# Patient Record
Sex: MALE | Race: ASIAN | NOT HISPANIC OR LATINO | URBAN - METROPOLITAN AREA
[De-identification: names, ages, dates, MRNs, and addresses within clinical notes are randomized per-mention and may not be internally consistent; named-entity substitution may affect disease eponyms.]

---

## 2020-12-04 ENCOUNTER — EMERGENCY (EMERGENCY)
Facility: HOSPITAL | Age: 70
LOS: 1 days | Discharge: HOME | End: 2020-12-04
Attending: EMERGENCY MEDICINE | Admitting: EMERGENCY MEDICINE
Payer: MEDICAID

## 2020-12-04 VITALS
RESPIRATION RATE: 17 BRPM | OXYGEN SATURATION: 98 % | DIASTOLIC BLOOD PRESSURE: 90 MMHG | TEMPERATURE: 98 F | HEART RATE: 60 BPM | SYSTOLIC BLOOD PRESSURE: 181 MMHG

## 2020-12-04 PROCEDURE — 99283 EMERGENCY DEPT VISIT LOW MDM: CPT

## 2020-12-04 PROCEDURE — 73562 X-RAY EXAM OF KNEE 3: CPT | Mod: 26,LT,RT

## 2020-12-04 RX ORDER — ACETAMINOPHEN 500 MG
650 TABLET ORAL ONCE
Refills: 0 | Status: COMPLETED | OUTPATIENT
Start: 2020-12-04 | End: 2020-12-04

## 2020-12-04 RX ORDER — KETOROLAC TROMETHAMINE 30 MG/ML
30 SYRINGE (ML) INJECTION ONCE
Refills: 0 | Status: DISCONTINUED | OUTPATIENT
Start: 2020-12-04 | End: 2020-12-04

## 2020-12-04 RX ADMIN — Medication 30 MILLIGRAM(S): at 13:39

## 2020-12-04 RX ADMIN — Medication 650 MILLIGRAM(S): at 13:39

## 2020-12-04 NOTE — ED PROVIDER NOTE - CLINICAL SUMMARY MEDICAL DECISION MAKING FREE TEXT BOX
71 yo male with pmh of htn and OA presents for bilateral knee pain x 3 years requesting b/l knee replacement. Ambulatory, no acute swelling. Neurovascularlly intact. Will get b/l XR and get in touch with ortho. Anticipate dc home.

## 2020-12-04 NOTE — ED PROVIDER NOTE - OBJECTIVE STATEMENT
71 yo male with pmh of htn and OA presents for bilateral knee pain x 3 years. States he was diagnosed with OA and was set to have b/l knee replacement in [place] but has not been able to because of COVID. States the pain is increasingly worse. Takes glucosamine at home for OA. Reports that he cannot sleep or walk. Deneis back pain, swelling, hot joint, and other associated sx. Requesting that we do the knee replacement today here.

## 2020-12-04 NOTE — ED PROVIDER NOTE - PHYSICAL EXAMINATION
PE:   GEN: Awake, alert, interactive, NAD, non-toxic appearing.   HEAD AND NECK: NC/AT. Airway patent. Neck supple.   EYES: Clear b/l. PERRL  CARDIAC: RRR. S1, S2. No evident pedal edema.    RESP: Normal respiratory effort with no use of accessory muscles or retractions. Clear throughout on auscultation.  ABD: soft, non-distended, non-tender. No rebound, no guarding.   NEURO: AOx3, CN II-XII grossly intact, no focal deficits.   MSK: Knees symmetric, FROM with pain. Ambulatory. Sensation intact. Moving all extremities with no apparent deformities.   SKIN: Warm, dry, intact normal color

## 2020-12-04 NOTE — ED ADULT NURSE NOTE - NSIMPLEMENTINTERV_GEN_ALL_ED
Implemented All Universal Safety Interventions:  [place] to call system. Call bell, personal items and telephone within reach. Instruct patient to call for assistance. Room bathroom lighting operational. Non-slip footwear when patient is off stretcher. Physically safe environment: no spills, clutter or unnecessary equipment. Stretcher in lowest position, wheels locked, appropriate side rails in place.

## 2020-12-04 NOTE — ED ADULT NURSE NOTE - OBJECTIVE STATEMENT
patient alert times four chief complaint bilateral knee pain 3xyears progressively getting worse. patient PMH osteoarthritis, scheduled for bilateral knee replacement in [place] but unable to travel due to covid 19. patient ambulates with cane at home. 1 person assist with wheelchair assistance in ED. skin warm dry appropriate color. + pedal pulses. Denies nausea headache denies paresthesias. patient medicated for pain sitting comfortably in stretcher fall precautions in place. updated on plan of care pending radiology result. Safety education reinforced with call bell within reach. Verbalizes understanding of use. Will continue to monitor.

## 2020-12-04 NOTE — ED ADULT TRIAGE NOTE - CHIEF COMPLAINT QUOTE
pt c/o b/l knee pain x 3 years. went to PMD and had x-ray, diagnosed with osteoarthritis, told to follow up with ortho. had a scheduled b/l knee replacement in [facility], but was unable to travel 2/2 covid.

## 2020-12-04 NOTE — ED ADULT NURSE NOTE - CHIEF COMPLAINT QUOTE
pt c/o b/l knee pain x 3 years. went to PMD and had x-ray, diagnosed with osteoarthritis, told to follow up with ortho. had a scheduled b/l knee replacement in [facility], but was unable to travel 2/2 covid.

## 2020-12-04 NOTE — ED PROVIDER NOTE - NS ED ROS FT
Constitutional: (-) Fever, (-) Anorexia, (-) Generalized Malaise  Eyes: (-)Discharge, (-) Irritation,  (-) Visual changes  EARS: (-) Ear Pain, (-) Apparent hearing changes  NOSE: (-) Congestion, (-) Bloody nose  MOUTH/THROAT: (-) Vocal Changes, (-) Drooling, (-) Sore throat  NECK: (-) Lumps, (-) Stiffness, (-) Pain  CV: (-) Chest Pain, (-) Palpitations, (-) Edema   RESP:  (-) Cough, (-) SOB, (-) REAGAN,  (-) Wheezing  GI: (-) Nausea, (-) Vomiting, (-) Abdominal Pain, (-) Diarrhea, (-) Constipation, (-) Bloody stools  : (-) Dysuria, (-) Frequency, (-) Hematuria, (-) Incontinence  MSK: (+) Joint Pain, (-) Back Pain, (-) Deformities  SKIN: (-) Wounds, (-) Color change, (-)Rash, (-) Swelling  NEURO:(-) Headache, (-) Dizziness, (-) Numbness/Tingling,  (-)LOC

## 2020-12-04 NOTE — ED PROVIDER NOTE - NSFOLLOWUPINSTRUCTIONS_ED_ALL_ED_FT
Take Naproxen 500mg 1 tab every 12 hours for pain.   Avoid strenuous activity.   Follow up with orthopedics as soon as possible for further evaluation.

## 2020-12-04 NOTE — ED PROVIDER NOTE - PATIENT PORTAL LINK FT
You can access the FollowMyHealth Patient Portal offered by [facility] by registering at the following website: [email or web address]. By joining [facility]’s FollowMyHealth portal, you will also be able to view your health information using other applications (apps) compatible with our system.

## 2020-12-04 NOTE — ED PROVIDER NOTE - CONDITION AT DISCHARGE:
Satisfactory

## 2020-12-09 ENCOUNTER — APPOINTMENT (OUTPATIENT)
Dept: ORTHOPEDIC SURGERY | Facility: CLINIC | Age: 70
End: 2020-12-09
Payer: MEDICAID

## 2020-12-09 VITALS
BODY MASS INDEX: 23.63 KG/M2 | OXYGEN SATURATION: 97 % | SYSTOLIC BLOOD PRESSURE: 132 MMHG | HEIGHT: 66 IN | WEIGHT: 147 LBS | HEART RATE: 93 BPM | DIASTOLIC BLOOD PRESSURE: 73 MMHG

## 2020-12-09 DIAGNOSIS — M25.562 PAIN IN RIGHT KNEE: ICD-10-CM

## 2020-12-09 DIAGNOSIS — Z72.3 LACK OF PHYSICAL EXERCISE: ICD-10-CM

## 2020-12-09 DIAGNOSIS — Z78.9 OTHER SPECIFIED HEALTH STATUS: ICD-10-CM

## 2020-12-09 DIAGNOSIS — M25.561 PAIN IN RIGHT KNEE: ICD-10-CM

## 2020-12-09 DIAGNOSIS — M17.12 UNILATERAL PRIMARY OSTEOARTHRITIS, LEFT KNEE: ICD-10-CM

## 2020-12-09 DIAGNOSIS — M17.11 UNILATERAL PRIMARY OSTEOARTHRITIS, RIGHT KNEE: ICD-10-CM

## 2020-12-09 DIAGNOSIS — Z86.79 PERSONAL HISTORY OF OTHER DISEASES OF THE CIRCULATORY SYSTEM: ICD-10-CM

## 2020-12-09 PROBLEM — Z00.00 ENCOUNTER FOR PREVENTIVE HEALTH EXAMINATION: Status: ACTIVE | Noted: 2020-12-09

## 2020-12-09 PROCEDURE — 99072 ADDL SUPL MATRL&STAF TM PHE: CPT

## 2020-12-09 PROCEDURE — 73564 X-RAY EXAM KNEE 4 OR MORE: CPT | Mod: RT

## 2020-12-09 PROCEDURE — 99205 OFFICE O/P NEW HI 60 MIN: CPT

## 2020-12-09 RX ORDER — NAPROXEN 500 MG/1
500 TABLET ORAL
Refills: 0 | Status: ACTIVE | COMMUNITY

## 2020-12-09 NOTE — PHYSICAL EXAM
[de-identified] : Physical examination of both knees discloses significant varus deformities bilaterally.  The patient is experiencing severe pain to the medial compartments of both knees with attempted passive motion which is highly limited. [de-identified] : X-rays taken of both knees in AP lateral skyline and open notch views disclose severe end-stage bone-on-bone degenerative arthritis with complete loss of the medial joint space, varus deformities

## 2020-12-09 NOTE — DISCUSSION/SUMMARY
[de-identified] : The patient and his son were informed of the findings.  They understand that total knee replacement is the only realistic option at this point.  The patient has previously undergone prior treatments including NSAIDs physical therapy and injections in [place].  In fact he was scheduled for surgery this past September until [facility] restricted his travel.  Arrangements will be made to proceed with right-sided total knee arthroplasty first followed by the left side several weeks later.  Medical clearance pending\par \par \par Aside from being seen and evaluated for his knee, the patient underwent a lengthy face to face discussion pertaining to total knee arthroplasty. This included instructions and information about the pre-operative preparation as well as information pertaining to the hospitalization and post-operative care and rehabilitation.\par \par The patient was also made aware of the potential risks and possible complications as it pertains to total knee arthroplasty as well as the general surgical and anesthesia risks and complications. This includes, but is not limited to, possible wound infection, cardio-pulmonary problems such as DVT, fat embolism and PE (potentially fatal), soft tissue swelling, stiffness and fibrosis, skin slough, muscle or nerve injury, compartment syndrome, blood transfusion reaction/infection. Problems with the knee components include possible loosening or wearing-out which would require revision surgery.\par \par Patient specific instruments may also be utilized, if applicable, to help achieve more optimal implant alignment, customized to your unique knee anatomy. MRI (Magnetic Resonance Images) and X-Ray images of your affected leg are scanned into an advanced web-based software program, which will generate virtual images of your knee which are read and reviewed and ultimately approved by me. Surgical instruments and guides are then designed and built, mapping out specific bone cuts to accurately align the implants to your knee while performing the surgery. Time under anesthesia may be reduced, which may also lead to less blood loss and a lower risk of infection.\par \par Revision surgery or repeat arthrotomy may also be required for deep infections, and in extreme cases, the implants may be removed either temporarily (staged procedures) or permanently (resulting in knee fusion or even amputation in extreme cases). \par \par The patient understands the above discussion and has been given ample time to ask any other questions or address any concerns pertaining to the purposed surgery. They are also aware that our office staff, specifically our surgical coordinator will continue to be available to guide and instruct the patient during the perioperative phase, as well as answer or relay any other questions that may arise.\par \par Total patient encounter time > 5 5 minutes\par \par

## 2020-12-09 NOTE — HISTORY OF PRESENT ILLNESS
[Worsening] : worsening [___ yrs] : [unfilled] year(s) ago [10] : a maximum pain level of 10/10 [Constant] : ~He/She~ states the symptoms seem to be constant [Walking] : worsened by walking [Knee Flexion] : worsened with knee flexion [None] : No relieving factors are noted [de-identified] : Pt presents for initial evaluation with pain in his bilateral knees worse to the right knee. Pt was seen at [facility] 12/4/2020 xray taken while lying down. Pt was given Naproxen for pain with no real relief, pt told to follow up with an orthopedist. Pt has crunching and buckling to the knees, pt had injections in [place] approx 6 mos ago  apparently, cortisone and gel injections the son states who is acting as . [de-identified] : certain movements, stairs

## 2020-12-21 ENCOUNTER — OUTPATIENT (OUTPATIENT)
Dept: OUTPATIENT SERVICES | Facility: HOSPITAL | Age: 70
LOS: 1 days | End: 2020-12-21
Payer: MEDICAID

## 2020-12-21 VITALS
WEIGHT: 139.99 LBS | OXYGEN SATURATION: 98 % | DIASTOLIC BLOOD PRESSURE: 80 MMHG | TEMPERATURE: 97 F | SYSTOLIC BLOOD PRESSURE: 138 MMHG | RESPIRATION RATE: 16 BRPM | HEART RATE: 74 BPM | HEIGHT: 65 IN

## 2020-12-21 DIAGNOSIS — G47.33 OBSTRUCTIVE SLEEP APNEA (ADULT) (PEDIATRIC): ICD-10-CM

## 2020-12-21 DIAGNOSIS — K21.9 GASTRO-ESOPHAGEAL REFLUX DISEASE WITHOUT ESOPHAGITIS: ICD-10-CM

## 2020-12-21 DIAGNOSIS — I10 ESSENTIAL (PRIMARY) HYPERTENSION: ICD-10-CM

## 2020-12-21 DIAGNOSIS — M17.11 UNILATERAL PRIMARY OSTEOARTHRITIS, RIGHT KNEE: ICD-10-CM

## 2020-12-21 DIAGNOSIS — Z98.890 OTHER SPECIFIED POSTPROCEDURAL STATES: Chronic | ICD-10-CM

## 2020-12-21 LAB
ANION GAP SERPL CALC-SCNC: 8 MMOL/L — SIGNIFICANT CHANGE UP (ref 7–14)
APPEARANCE UR: CLEAR — SIGNIFICANT CHANGE UP
BILIRUB UR-MCNC: NEGATIVE — SIGNIFICANT CHANGE UP
BLD GP AB SCN SERPL QL: NEGATIVE — SIGNIFICANT CHANGE UP
BUN SERPL-MCNC: 14 MG/DL — SIGNIFICANT CHANGE UP (ref 7–23)
CALCIUM SERPL-MCNC: 9.5 MG/DL — SIGNIFICANT CHANGE UP (ref 8.4–10.5)
CHLORIDE SERPL-SCNC: 102 MMOL/L — SIGNIFICANT CHANGE UP (ref 98–107)
CO2 SERPL-SCNC: 27 MMOL/L — SIGNIFICANT CHANGE UP (ref 22–31)
COLOR SPEC: SIGNIFICANT CHANGE UP
CREAT SERPL-MCNC: 1 MG/DL — SIGNIFICANT CHANGE UP (ref 0.5–1.3)
DIFF PNL FLD: NEGATIVE — SIGNIFICANT CHANGE UP
GLUCOSE SERPL-MCNC: 88 MG/DL — SIGNIFICANT CHANGE UP (ref 70–99)
GLUCOSE UR QL: NEGATIVE — SIGNIFICANT CHANGE UP
HCT VFR BLD CALC: 41 % — SIGNIFICANT CHANGE UP (ref 39–50)
HGB BLD-MCNC: 13.4 G/DL — SIGNIFICANT CHANGE UP (ref 13–17)
KETONES UR-MCNC: NEGATIVE — SIGNIFICANT CHANGE UP
LEUKOCYTE ESTERASE UR-ACNC: NEGATIVE — SIGNIFICANT CHANGE UP
MCHC RBC-ENTMCNC: 29.6 PG — SIGNIFICANT CHANGE UP (ref 27–34)
MCHC RBC-ENTMCNC: 32.7 GM/DL — SIGNIFICANT CHANGE UP (ref 32–36)
MCV RBC AUTO: 90.7 FL — SIGNIFICANT CHANGE UP (ref 80–100)
NITRITE UR-MCNC: NEGATIVE — SIGNIFICANT CHANGE UP
NRBC # BLD: 0 /100 WBCS — SIGNIFICANT CHANGE UP
NRBC # FLD: 0 K/UL — SIGNIFICANT CHANGE UP
PH UR: 6 — SIGNIFICANT CHANGE UP (ref 5–8)
PLATELET # BLD AUTO: 156 K/UL — SIGNIFICANT CHANGE UP (ref 150–400)
POTASSIUM SERPL-MCNC: 4.4 MMOL/L — SIGNIFICANT CHANGE UP (ref 3.5–5.3)
POTASSIUM SERPL-SCNC: 4.4 MMOL/L — SIGNIFICANT CHANGE UP (ref 3.5–5.3)
PROT UR-MCNC: NEGATIVE — SIGNIFICANT CHANGE UP
RBC # BLD: 4.52 M/UL — SIGNIFICANT CHANGE UP (ref 4.2–5.8)
RBC # FLD: 13 % — SIGNIFICANT CHANGE UP (ref 10.3–14.5)
RH IG SCN BLD-IMP: POSITIVE — SIGNIFICANT CHANGE UP
SODIUM SERPL-SCNC: 137 MMOL/L — SIGNIFICANT CHANGE UP (ref 135–145)
SP GR SPEC: 1.01 — LOW (ref 1.01–1.02)
UROBILINOGEN FLD QL: SIGNIFICANT CHANGE UP
WBC # BLD: 6.35 K/UL — SIGNIFICANT CHANGE UP (ref 3.8–10.5)
WBC # FLD AUTO: 6.35 K/UL — SIGNIFICANT CHANGE UP (ref 3.8–10.5)

## 2020-12-21 PROCEDURE — 93010 ELECTROCARDIOGRAM REPORT: CPT

## 2020-12-21 RX ORDER — SODIUM CHLORIDE 9 MG/ML
3 INJECTION INTRAMUSCULAR; INTRAVENOUS; SUBCUTANEOUS EVERY 8 HOURS
Refills: 0 | Status: DISCONTINUED | OUTPATIENT
Start: 2020-12-28 | End: 2021-01-01

## 2020-12-21 RX ORDER — ASPIRIN/CALCIUM CARB/MAGNESIUM 324 MG
1 TABLET ORAL
Qty: 0 | Refills: 0 | DISCHARGE

## 2020-12-21 NOTE — H&P PST ADULT - NSICDXPROBLEM_GEN_ALL_CORE_FT
PROBLEM DIAGNOSES  Problem: Unilateral primary osteoarthritis, right knee  Assessment and Plan:     Problem: Hypertension  Assessment and Plan:     Problem: GERD (gastroesophageal reflux disease)  Assessment and Plan:        PROBLEM DIAGNOSES  Problem: Unilateral primary osteoarthritis, right knee  Assessment and Plan: Right Total Knee Replacement  Pre op instructions including Hibiclens with teach back reviewed with pt via [place]  Grecia   # [id number]. Pt reports understanding of pre op instructions  Pt to Dr Jenkins for pre op medical evaluation  Per pt Covid test scheduled pre op     Problem: GERD (gastroesophageal reflux disease)  Assessment and Plan:     Problem: Hypertension  Assessment and Plan:        PROBLEM DIAGNOSES  Problem: Unilateral primary osteoarthritis, right knee  Assessment and Plan: Right Total Knee Replacement  Pre op instructions including Hibiclens with teach back reviewed with pt via [place]  Grecia   # [id number]. Pt reports understanding of pre op instructions  Pt to Dr Jenkins for pre op medical evaluation  Per pt Covid test scheduled pre op     Problem: GERD (gastroesophageal reflux disease)  Assessment and Plan: Pt to take famotidine dos     Problem: Hypertension  Assessment and Plan: Pt to take Losartan dos     Problem: WAQAR (obstructive sleep apnea)  Assessment and Plan: WAQAR Precautions  OR booking notified via fax        PROBLEM DIAGNOSES  Problem: Unilateral primary osteoarthritis, right knee  Assessment and Plan: Right Total Knee Replacement  Pre op instructions including Hibiclens with teach back reviewed with pt via [place]  Grecia   # [id number]. Pt reports understanding of pre op instructions  Pt to Dr Jenkins for pre op medical evaluation  Per pt Covid test scheduled pre op   During instructions pt reported pain medicine from [place]; pt does not recall name ; after extendive conversation pt agreed to have son call after arrival home with name of rx    Problem: GERD (gastroesophageal reflux disease)  Assessment and Plan: Pt to take famotidine evening prior to surgery as per usual    Problem: Hypertension  Assessment and Plan: Pt to take Losartan dos     Problem: WAQAR (obstructive sleep apnea)  Assessment and Plan: WAQAR Precautions  OR booking notified via fax        PROBLEM DIAGNOSES  Problem: Unilateral primary osteoarthritis, right knee  Assessment and Plan: Right Total Knee Replacement  Pre op instructions including Hibiclens with teach back reviewed with pt via [email or web address]  Grecia   # [id number]. Pt reports understanding of pre op instructions. Pre op shower instructions also reviewed with son Alverto  Pt to Dr Jenkins for pre op medical evaluation; letter provided requested pre op instructions related to aspirin  Per pt Covid test scheduled pre op   During instructions pt reported pain medicine from [place]; pt does not recall name ; after extendive conversation pt agreed to have son call after arrival home with name of rx    Problem: GERD (gastroesophageal reflux disease)  Assessment and Plan: Pt to take famotidine evening prior to surgery as per usual    Problem: Hypertension  Assessment and Plan: Pt to take Losartan dos     Problem: WAQAR (obstructive sleep apnea)  Assessment and Plan: WAQAR Precautions  OR booking notified via fax        PROBLEM DIAGNOSES  Problem: Unilateral primary osteoarthritis, right knee  Assessment and Plan: Right Total Knee Replacement  Pre op instructions including Hibiclens with teach back reviewed with pt via [place]  Grecia   # [id number]. Pt reports understanding of pre op instructions. Pre op shower instructions also reviewed with maritza Del Toro  Pt to Dr Jenkins for pre op medical evaluation; letter provided requested pre op instructions related to aspirin  Add ; 12/21/2020  3:40 pm ; s/w maritza Del Toro ; last ASA 2 weeks ago . Per Alverto as per pcp asa to be held pre op  Per pt Covid test scheduled pre op       Problem: GERD (gastroesophageal reflux disease)  Assessment and Plan: Pt to take famotidine evening prior to surgery as per usual    Problem: Hypertension  Assessment and Plan: Pt to take Losartan dos     Problem: WAQAR (obstructive sleep apnea)  Assessment and Plan: WAQAR Precautions  OR booking notified via fax

## 2020-12-21 NOTE — H&P PST ADULT - MENTAL STATUS
Pt is a fair historian ; information obtained with assistance of pacific  Pt is a fair historian ; information obtained with assistance of pacific ; site and surgeon reviewed with maritza Del Toro

## 2020-12-21 NOTE — H&P PST ADULT - NSICDXPASTMEDICALHX_GEN_ALL_CORE_FT
PAST MEDICAL HISTORY:  Arthritis     GERD (gastroesophageal reflux disease)     Hypercholesterolemia     Hypertension

## 2020-12-21 NOTE — H&P PST ADULT - MUSCULOSKELETAL
decreased ROM due to pain detailed exam details… pt in w/c , ambulated with assistance ; pt reports able to shower without assistance/decreased ROM due to pain

## 2020-12-21 NOTE — H&P PST ADULT - NS PRO FEM  PAP SMEARS 3YRS
Plan: -Avoid high potassium foods \\n-Drink plenty of water
Continue Regimen: benzoyl peroxide 10 % topical cleanser \\nCleanse affected area on face twice a day\\n\\nclindamycin 1 % topical gel \\nApply to face twice a day\\n\\ntretinoin 0.025 % topical gel \\nApply to face once a day at bedtime\\n\\nspironolactone 50 mg tablet - Take one tablet daily
Detail Level: Zone
not applicable (Male)

## 2020-12-21 NOTE — H&P PST ADULT - GENERAL COMMENTS
pt reports decrease in appetite with gerd pt reports decrease in appetite with gerd; rx famotidine ; metoclopramide with meals pt reports decrease in appetite with gerd; rx famotidine ; metoclopramide ? with meals pt reports decrease in appetite with gerd; rx famotidine

## 2020-12-21 NOTE — H&P PST ADULT - CONSTITUTIONAL DETAILS
Past Medical History:   Diagnosis Date   • Actinic keratosis 02/26/2008    right cheek   • Cough 02/26/2008    chronic-due to sinus and reflux   • Esophageal reflux 02/26/2008   • h/o diverticulitis    • Hypertrophy (benign) of prostate 02/26/2008    mild to moderate   • Other and unspecified disc disorder of thoracic region 02/27/2008    mild anterior osteophytes and mild anterior disc space narrowing at  few levels   • PAIN BACK  (See also SPONDYLOSIS) 02/26/2008       
well-developed/well-groomed/well-nourished/no distress

## 2020-12-21 NOTE — H&P PST ADULT - HISTORY OF PRESENT ILLNESS
Pt is a 70 y.o. male ; pt speaks [place] ; information obtained from patient with assistance of [employer]  Grecia Mckenzie . Pt Pt is a 70 y.o. male ; pt speaks [place] ; information obtained from patient with assistance of [employer]  Grecia [id number] . Pt reports bilateral knee pain; pt states right > left . Pt to surgeon ; pt now resents for Right Total Knee Replacement  Pt is a 70 y.o. male ; pt speaks [place] ; information obtained from patient with assistance of [employer]  Grecia [id number] . Pt reports bilateral knee pain; pt states right > left . Pt to surgeon ; pt now presents for Right Total Knee Replacement    Pt is a poor historian         Pt is a 70 y.o. male ; pt speaks [place] ; information obtained from patient with assistance of [employer]  Grecia [id number] . Pt reports bilateral knee pain; pt states right > left . Pt to surgeon ; pt now presents for Right Total Knee Replacement    Pt is a poor historian  ; site and surgeon verified with son

## 2020-12-21 NOTE — H&P PST ADULT - NSANTHOSAYNRD_GEN_A_CORE
Cathy Toledo is a 80 y.o. male who was seen by synchronous (real-time) audio-video technology on 6/11/2020. Consent: Cathy Toledo, who was seen by synchronous (real-time) audio-video technology, and/or his healthcare decision maker, is aware that this patient-initiated, Telehealth encounter on 6/11/2020 is a billable service, with coverage as determined by his insurance carrier. He is aware that he may receive a bill and has provided verbal consent to proceed: Yes. Assessment & Plan:  
 
Diagnoses and all orders for this visit: 
 
1. Gastroenteritis 
-     ciprofloxacin HCl (CIPRO) 500 mg tablet; Take 1 Tab by mouth two (2) times a day for 5 days. -     ondansetron (ZOFRAN ODT) 8 mg disintegrating tablet; Take 1 Tab by mouth every eight (8) hours as needed for Nausea or Vomiting. Follow-up and Dispositions · Return if symptoms worsen or fail to improve. [address] Subjective: Cathy Toledo is a 80 y.o. male who was seen for Diarrhea 
c/o started 3 days ago with N/V and diarrhea, loose BM's, 2-3 daily, not any better No fever or chills No abdominal pain Prior to Admission medications Medication Sig Start Date End Date Taking? Authorizing Provider  
ciprofloxacin HCl (CIPRO) 500 mg tablet Take 1 Tab by mouth two (2) times a day for 5 days. 6/11/20 6/16/20 Yes Darron MCCORMICK MD  
ondansetron (ZOFRAN ODT) 8 mg disintegrating tablet Take 1 Tab by mouth every eight (8) hours as needed for Nausea or Vomiting. 6/11/20  Yes Jordi Ibarra MD  
valsartan-hydroCHLOROthiazide (DIOVAN-HCT) 160-12.5 mg per tablet Take 1 Tab by mouth daily. 10/8/19  Yes Darron MCCORMICK MD  
amLODIPine (NORVASC) 5 mg tablet Take 1 Tab by mouth daily.  10/8/19  Yes Jordi Ibarra MD  
varicella-zoster recombinant, PF, (SHINGRIX, PF,) 50 mcg/0.5 mL susr injection 0.5mL by IntraMUSCular route once now and then repeat in 2-6 months 10/8/19   Jordi Ibarra MD  
 albuterol (PROVENTIL HFA, VENTOLIN HFA, PROAIR HFA) 90 mcg/actuation inhaler Take 2 Puffs by inhalation every six (6) hours as needed for Wheezing or Shortness of Breath. 10/8/19   Gurinder MCCORMICK MD  
acetaminophen (TYLENOL) 325 mg tablet Take  by mouth every four (4) hours as needed for Pain. Provider, Historical  
 
No Known Allergies Patient Active Problem List  
 Diagnosis Date Noted  Essential hypertension 06/05/2019  Pulmonary emphysema ([address] 06/05/2019  Mixed hyperlipidemia 06/05/2019 Past Surgical History:  
Procedure Laterality Date  HX ACL RECONSTRUCTION Bilateral 1980  HX CATARACT REMOVAL Left 07/18/2019  HX CHOLECYSTECTOMY Social History Tobacco Use  Smoking status: Former Smoker  Smokeless tobacco: Never Used Substance Use Topics  Alcohol use: Yes Alcohol/week: 10.0 standard drinks Types: 10 Glasses of wine per week Comment: two glasses of wine a day Review of Systems Constitutional: Negative for chills and fever. Gastrointestinal: Negative for abdominal pain and melena. Objective: There were no vitals taken for this visit. General: alert, cooperative, no distress Mental  status: normal mood, behavior, speech, dress, motor activity, and thought processes, able to follow commands HENT: NCAT Neck: no visualized mass Resp: no respiratory distress Neuro: no gross deficits Skin: no discoloration or lesions of concern on visible areas Psychiatric: normal affect, consistent with stated mood, no evidence of hallucinations Additional exam findings: We discussed the expected course, resolution and complications of the diagnosis(es) in detail. Medication risks, benefits, costs, interactions, and alternatives were discussed as indicated. I advised him to contact the office if his condition worsens, changes or fails to improve as anticipated. He expressed understanding with the diagnosis(es) and plan. Rosalba Handler is a 80 y.o. male who was evaluated by a video visit encounter for concerns as above. Patient identification was verified prior to start of the visit. A caregiver was present when appropriate. Due to this being a TeleHealth encounter (During [facility]- public health emergency), evaluation of the following organ systems was limited: Vitals/Constitutional/EENT/Resp/CV/GI//MS/Neuro/Skin/Heme-Lymph-Imm. Pursuant to the emergency declaration under the [address] [place] [address], [facility] waiver authority and the [employer] Act, this Virtual  Visit was conducted, with patient's (and/or legal guardian's) consent, to reduce the patient's risk of exposure to COVID-19 and provide necessary medical care. Services were provided through a video synchronous discussion virtually to substitute for in-person clinic visit. Patient and provider were located at their individual homes. This service was provided through Telehealth, both the (pt location) pt home and [employer] Jessica Levin MD 
 
 No. WAQAR screening performed.  STOP BANG Legend: 0-2 = LOW Risk; 3-4 = INTERMEDIATE Risk; 5-8 = HIGH Risk

## 2020-12-22 LAB
CULTURE RESULTS: SIGNIFICANT CHANGE UP
SPECIMEN SOURCE: SIGNIFICANT CHANGE UP

## 2020-12-23 LAB
CULTURE RESULTS: SIGNIFICANT CHANGE UP
SPECIMEN SOURCE: SIGNIFICANT CHANGE UP

## 2020-12-24 DIAGNOSIS — Z01.818 ENCOUNTER FOR OTHER PREPROCEDURAL EXAMINATION: ICD-10-CM

## 2020-12-25 ENCOUNTER — APPOINTMENT (OUTPATIENT)
Dept: DISASTER EMERGENCY | Facility: CLINIC | Age: 70
End: 2020-12-25

## 2020-12-26 LAB — SARS-COV-2 N GENE NPH QL NAA+PROBE: NOT DETECTED

## 2020-12-27 ENCOUNTER — TRANSCRIPTION ENCOUNTER (OUTPATIENT)
Age: 70
End: 2020-12-27

## 2020-12-28 ENCOUNTER — TRANSCRIPTION ENCOUNTER (OUTPATIENT)
Age: 70
End: 2020-12-28

## 2020-12-28 ENCOUNTER — INPATIENT (INPATIENT)
Facility: HOSPITAL | Age: 70
LOS: 3 days | Discharge: NURSING, REHAB OR LONG-TERM CARE | End: 2021-01-01
Attending: ORTHOPAEDIC SURGERY | Admitting: ORTHOPAEDIC SURGERY
Payer: MEDICAID

## 2020-12-28 ENCOUNTER — RESULT REVIEW (OUTPATIENT)
Age: 70
End: 2020-12-28

## 2020-12-28 ENCOUNTER — APPOINTMENT (OUTPATIENT)
Dept: ORTHOPEDIC SURGERY | Facility: HOSPITAL | Age: 70
End: 2020-12-28

## 2020-12-28 VITALS
RESPIRATION RATE: 16 BRPM | TEMPERATURE: 98 F | DIASTOLIC BLOOD PRESSURE: 86 MMHG | HEIGHT: 65 IN | OXYGEN SATURATION: 97 % | SYSTOLIC BLOOD PRESSURE: 157 MMHG | HEART RATE: 73 BPM | WEIGHT: 139.99 LBS

## 2020-12-28 DIAGNOSIS — M17.11 UNILATERAL PRIMARY OSTEOARTHRITIS, RIGHT KNEE: ICD-10-CM

## 2020-12-28 DIAGNOSIS — Z98.890 OTHER SPECIFIED POSTPROCEDURAL STATES: Chronic | ICD-10-CM

## 2020-12-28 LAB
ANION GAP SERPL CALC-SCNC: 7 MMOL/L — SIGNIFICANT CHANGE UP (ref 7–14)
BUN SERPL-MCNC: 15 MG/DL — SIGNIFICANT CHANGE UP (ref 7–23)
CALCIUM SERPL-MCNC: 9.3 MG/DL — SIGNIFICANT CHANGE UP (ref 8.4–10.5)
CHLORIDE SERPL-SCNC: 103 MMOL/L — SIGNIFICANT CHANGE UP (ref 98–107)
CO2 SERPL-SCNC: 26 MMOL/L — SIGNIFICANT CHANGE UP (ref 22–31)
CREAT SERPL-MCNC: 0.94 MG/DL — SIGNIFICANT CHANGE UP (ref 0.5–1.3)
GLUCOSE SERPL-MCNC: 103 MG/DL — HIGH (ref 70–99)
HCT VFR BLD CALC: 39.7 % — SIGNIFICANT CHANGE UP (ref 39–50)
HGB BLD-MCNC: 13.1 G/DL — SIGNIFICANT CHANGE UP (ref 13–17)
MCHC RBC-ENTMCNC: 29.6 PG — SIGNIFICANT CHANGE UP (ref 27–34)
MCHC RBC-ENTMCNC: 33 GM/DL — SIGNIFICANT CHANGE UP (ref 32–36)
MCV RBC AUTO: 89.6 FL — SIGNIFICANT CHANGE UP (ref 80–100)
NRBC # BLD: 0 /100 WBCS — SIGNIFICANT CHANGE UP
NRBC # FLD: 0 K/UL — SIGNIFICANT CHANGE UP
PLATELET # BLD AUTO: 126 K/UL — LOW (ref 150–400)
POTASSIUM SERPL-MCNC: 4.9 MMOL/L — SIGNIFICANT CHANGE UP (ref 3.5–5.3)
POTASSIUM SERPL-SCNC: 4.9 MMOL/L — SIGNIFICANT CHANGE UP (ref 3.5–5.3)
RBC # BLD: 4.43 M/UL — SIGNIFICANT CHANGE UP (ref 4.2–5.8)
RBC # FLD: 13.2 % — SIGNIFICANT CHANGE UP (ref 10.3–14.5)
RH IG SCN BLD-IMP: POSITIVE — SIGNIFICANT CHANGE UP
SODIUM SERPL-SCNC: 136 MMOL/L — SIGNIFICANT CHANGE UP (ref 135–145)
WBC # BLD: 5.64 K/UL — SIGNIFICANT CHANGE UP (ref 3.8–10.5)
WBC # FLD AUTO: 5.64 K/UL — SIGNIFICANT CHANGE UP (ref 3.8–10.5)

## 2020-12-28 PROCEDURE — 73560 X-RAY EXAM OF KNEE 1 OR 2: CPT | Mod: 26,RT,76

## 2020-12-28 PROCEDURE — 88311 DECALCIFY TISSUE: CPT | Mod: 26

## 2020-12-28 PROCEDURE — 88305 TISSUE EXAM BY PATHOLOGIST: CPT | Mod: 26

## 2020-12-28 RX ORDER — OXYCODONE HYDROCHLORIDE 5 MG/1
5 TABLET ORAL
Refills: 0 | Status: DISCONTINUED | OUTPATIENT
Start: 2020-12-28 | End: 2020-12-28

## 2020-12-28 RX ORDER — SENNA PLUS 8.6 MG/1
2 TABLET ORAL AT BEDTIME
Refills: 0 | Status: DISCONTINUED | OUTPATIENT
Start: 2020-12-28 | End: 2021-01-01

## 2020-12-28 RX ORDER — ATORVASTATIN CALCIUM 80 MG/1
20 TABLET, FILM COATED ORAL AT BEDTIME
Refills: 0 | Status: DISCONTINUED | OUTPATIENT
Start: 2020-12-28 | End: 2021-01-01

## 2020-12-28 RX ORDER — INFLUENZA VIRUS VACCINE 15; 15; 15; 15 UG/.5ML; UG/.5ML; UG/.5ML; UG/.5ML
0.7 SUSPENSION INTRAMUSCULAR ONCE
Refills: 0 | Status: COMPLETED | OUTPATIENT
Start: 2020-12-28 | End: 2020-12-31

## 2020-12-28 RX ORDER — SODIUM CHLORIDE 9 MG/ML
1000 INJECTION, SOLUTION INTRAVENOUS
Refills: 0 | Status: DISCONTINUED | OUTPATIENT
Start: 2020-12-28 | End: 2020-12-28

## 2020-12-28 RX ORDER — OXYCODONE HYDROCHLORIDE 5 MG/1
10 TABLET ORAL
Refills: 0 | Status: DISCONTINUED | OUTPATIENT
Start: 2020-12-28 | End: 2021-01-01

## 2020-12-28 RX ORDER — HYDROCHLOROTHIAZIDE 25 MG
12.5 TABLET ORAL DAILY
Refills: 0 | Status: DISCONTINUED | OUTPATIENT
Start: 2020-12-28 | End: 2021-01-01

## 2020-12-28 RX ORDER — ONDANSETRON 8 MG/1
4 TABLET, FILM COATED ORAL ONCE
Refills: 0 | Status: DISCONTINUED | OUTPATIENT
Start: 2020-12-28 | End: 2020-12-28

## 2020-12-28 RX ORDER — SODIUM CHLORIDE 9 MG/ML
1000 INJECTION, SOLUTION INTRAVENOUS ONCE
Refills: 0 | Status: COMPLETED | OUTPATIENT
Start: 2020-12-28 | End: 2020-12-28

## 2020-12-28 RX ORDER — ASPIRIN/CALCIUM CARB/MAGNESIUM 324 MG
81 TABLET ORAL
Refills: 0 | Status: DISCONTINUED | OUTPATIENT
Start: 2020-12-28 | End: 2021-01-01

## 2020-12-28 RX ORDER — CEFAZOLIN SODIUM 1 G
2000 VIAL (EA) INJECTION EVERY 8 HOURS
Refills: 0 | Status: COMPLETED | OUTPATIENT
Start: 2020-12-28 | End: 2020-12-29

## 2020-12-28 RX ORDER — ACETAMINOPHEN 500 MG
1000 TABLET ORAL EVERY 8 HOURS
Refills: 0 | Status: DISCONTINUED | OUTPATIENT
Start: 2020-12-28 | End: 2021-01-01

## 2020-12-28 RX ORDER — DEXAMETHASONE 0.5 MG/5ML
10 ELIXIR ORAL EVERY 8 HOURS
Refills: 0 | Status: COMPLETED | OUTPATIENT
Start: 2020-12-28 | End: 2020-12-29

## 2020-12-28 RX ORDER — METOCLOPRAMIDE HCL 10 MG
10 TABLET ORAL THREE TIMES A DAY
Refills: 0 | Status: DISCONTINUED | OUTPATIENT
Start: 2020-12-28 | End: 2021-01-01

## 2020-12-28 RX ORDER — ACETAMINOPHEN 500 MG
975 TABLET ORAL ONCE
Refills: 0 | Status: COMPLETED | OUTPATIENT
Start: 2020-12-28 | End: 2020-12-28

## 2020-12-28 RX ORDER — PANTOPRAZOLE SODIUM 20 MG/1
40 TABLET, DELAYED RELEASE ORAL
Refills: 0 | Status: DISCONTINUED | OUTPATIENT
Start: 2020-12-28 | End: 2021-01-01

## 2020-12-28 RX ORDER — LOSARTAN POTASSIUM 100 MG/1
50 TABLET, FILM COATED ORAL DAILY
Refills: 0 | Status: DISCONTINUED | OUTPATIENT
Start: 2020-12-28 | End: 2021-01-01

## 2020-12-28 RX ORDER — GABAPENTIN 400 MG/1
100 CAPSULE ORAL THREE TIMES A DAY
Refills: 0 | Status: DISCONTINUED | OUTPATIENT
Start: 2020-12-28 | End: 2021-01-01

## 2020-12-28 RX ORDER — ONDANSETRON 8 MG/1
4 TABLET, FILM COATED ORAL EVERY 6 HOURS
Refills: 0 | Status: DISCONTINUED | OUTPATIENT
Start: 2020-12-28 | End: 2021-01-01

## 2020-12-28 RX ORDER — PANTOPRAZOLE SODIUM 20 MG/1
40 TABLET, DELAYED RELEASE ORAL ONCE
Refills: 0 | Status: COMPLETED | OUTPATIENT
Start: 2020-12-28 | End: 2020-12-28

## 2020-12-28 RX ORDER — HYDROMORPHONE HYDROCHLORIDE 2 MG/ML
0.25 INJECTION INTRAMUSCULAR; INTRAVENOUS; SUBCUTANEOUS
Refills: 0 | Status: DISCONTINUED | OUTPATIENT
Start: 2020-12-28 | End: 2020-12-28

## 2020-12-28 RX ORDER — HYDROMORPHONE HYDROCHLORIDE 2 MG/ML
0.5 INJECTION INTRAMUSCULAR; INTRAVENOUS; SUBCUTANEOUS ONCE
Refills: 0 | Status: DISCONTINUED | OUTPATIENT
Start: 2020-12-28 | End: 2021-01-01

## 2020-12-28 RX ORDER — TRAMADOL HYDROCHLORIDE 50 MG/1
50 TABLET ORAL ONCE
Refills: 0 | Status: DISCONTINUED | OUTPATIENT
Start: 2020-12-28 | End: 2020-12-28

## 2020-12-28 RX ORDER — POLYETHYLENE GLYCOL 3350 17 G/17G
17 POWDER, FOR SOLUTION ORAL AT BEDTIME
Refills: 0 | Status: DISCONTINUED | OUTPATIENT
Start: 2020-12-28 | End: 2020-12-29

## 2020-12-28 RX ORDER — KETOROLAC TROMETHAMINE 30 MG/ML
15 SYRINGE (ML) INJECTION EVERY 6 HOURS
Refills: 0 | Status: DISCONTINUED | OUTPATIENT
Start: 2020-12-28 | End: 2020-12-29

## 2020-12-28 RX ORDER — INFLUENZA VIRUS VACCINE 15; 15; 15; 15 UG/.5ML; UG/.5ML; UG/.5ML; UG/.5ML
0.5 SUSPENSION INTRAMUSCULAR ONCE
Refills: 0 | Status: DISCONTINUED | OUTPATIENT
Start: 2020-12-28 | End: 2020-12-28

## 2020-12-28 RX ORDER — OXYCODONE HYDROCHLORIDE 5 MG/1
2.5 TABLET ORAL
Refills: 0 | Status: DISCONTINUED | OUTPATIENT
Start: 2020-12-28 | End: 2020-12-29

## 2020-12-28 RX ORDER — ACETAMINOPHEN 500 MG
1000 TABLET ORAL ONCE
Refills: 0 | Status: COMPLETED | OUTPATIENT
Start: 2020-12-28 | End: 2020-12-28

## 2020-12-28 RX ORDER — OXYCODONE HYDROCHLORIDE 5 MG/1
5 TABLET ORAL ONCE
Refills: 0 | Status: DISCONTINUED | OUTPATIENT
Start: 2020-12-28 | End: 2020-12-28

## 2020-12-28 RX ORDER — TRAMADOL HYDROCHLORIDE 50 MG/1
50 TABLET ORAL EVERY 6 HOURS
Refills: 0 | Status: DISCONTINUED | OUTPATIENT
Start: 2020-12-28 | End: 2021-01-01

## 2020-12-28 RX ADMIN — PANTOPRAZOLE SODIUM 40 MILLIGRAM(S): 20 TABLET, DELAYED RELEASE ORAL at 07:02

## 2020-12-28 RX ADMIN — Medication 30 MILLILITER(S): at 21:51

## 2020-12-28 RX ADMIN — Medication 975 MILLIGRAM(S): at 07:02

## 2020-12-28 RX ADMIN — Medication 400 MILLIGRAM(S): at 16:34

## 2020-12-28 RX ADMIN — Medication 15 MILLIGRAM(S): at 12:05

## 2020-12-28 RX ADMIN — Medication 81 MILLIGRAM(S): at 19:24

## 2020-12-28 RX ADMIN — SODIUM CHLORIDE 1000 MILLILITER(S): 9 INJECTION, SOLUTION INTRAVENOUS at 11:40

## 2020-12-28 RX ADMIN — SODIUM CHLORIDE 75 MILLILITER(S): 9 INJECTION, SOLUTION INTRAVENOUS at 11:40

## 2020-12-28 RX ADMIN — Medication 15 MILLIGRAM(S): at 19:23

## 2020-12-28 RX ADMIN — LOSARTAN POTASSIUM 50 MILLIGRAM(S): 100 TABLET, FILM COATED ORAL at 19:24

## 2020-12-28 RX ADMIN — Medication 10 MILLIGRAM(S): at 16:33

## 2020-12-28 RX ADMIN — TRAMADOL HYDROCHLORIDE 50 MILLIGRAM(S): 50 TABLET ORAL at 13:29

## 2020-12-28 RX ADMIN — Medication 15 MILLIGRAM(S): at 12:25

## 2020-12-28 RX ADMIN — TRAMADOL HYDROCHLORIDE 50 MILLIGRAM(S): 50 TABLET ORAL at 19:23

## 2020-12-28 RX ADMIN — SODIUM CHLORIDE 1000 MILLILITER(S): 9 INJECTION, SOLUTION INTRAVENOUS at 17:34

## 2020-12-28 RX ADMIN — SODIUM CHLORIDE 3 MILLILITER(S): 9 INJECTION INTRAMUSCULAR; INTRAVENOUS; SUBCUTANEOUS at 21:48

## 2020-12-28 RX ADMIN — ATORVASTATIN CALCIUM 20 MILLIGRAM(S): 80 TABLET, FILM COATED ORAL at 21:51

## 2020-12-28 RX ADMIN — Medication 10 MILLIGRAM(S): at 21:51

## 2020-12-28 RX ADMIN — Medication 102 MILLIGRAM(S): at 22:58

## 2020-12-28 RX ADMIN — GABAPENTIN 100 MILLIGRAM(S): 400 CAPSULE ORAL at 21:51

## 2020-12-28 RX ADMIN — SODIUM CHLORIDE 3 MILLILITER(S): 9 INJECTION INTRAMUSCULAR; INTRAVENOUS; SUBCUTANEOUS at 14:37

## 2020-12-28 RX ADMIN — Medication 100 MILLIGRAM(S): at 17:34

## 2020-12-28 RX ADMIN — GABAPENTIN 100 MILLIGRAM(S): 400 CAPSULE ORAL at 16:33

## 2020-12-28 RX ADMIN — TRAMADOL HYDROCHLORIDE 50 MILLIGRAM(S): 50 TABLET ORAL at 07:02

## 2020-12-28 RX ADMIN — Medication 12.5 MILLIGRAM(S): at 19:24

## 2020-12-28 NOTE — PHYSICAL THERAPY INITIAL EVALUATION ADULT - ADDITIONAL COMMENTS
Patient report lives with son and daughter in law in house, 6 stairs to enter, ambulated with out assistive device.

## 2020-12-28 NOTE — PHYSICAL THERAPY INITIAL EVALUATION ADULT - GENERAL OBSERVATIONS, REHAB EVAL
Patient seen supine in bed in stretcher, ace wrap dressing on right knee, patient agreed to participate in PT

## 2020-12-28 NOTE — DISCHARGE NOTE PROVIDER - HOSPITAL COURSE
77 y/o Male presents to [facility] for orthopedic surgery. Patient s/p Right total knee arthroplasty with Dr. Pitts  on 12/28/2020. Patient tolerated the procedure well without any intraoperative complications. Patient tolerated physical therapy well, pain is controlled. Pt is weight bearing as tolerated with cane/walker as needed. Seen by medical attending for continuity of care and management and cleared for safe discharge home. Keep dressing/incision clean, dry and intact. Any suture/staples to be removed on post-op day #14 at office visit. Pt is on  Aspirin 81mg BID for DVT prophylaxis, please take for 6 weeks unless otherwise instructed by your surgeon. Please follow up with Dr. Pitts in 1-2 weeks, call office to make appointment, [phone]. Please follow up with your PMD for continuity of care and management as medications may have changed.

## 2020-12-28 NOTE — DISCHARGE NOTE NURSING/CASE MANAGEMENT/SOCIAL WORK - NSDCPNINST_GEN_ALL_CORE
You have a post op appointment with Dr. Pitts on January 11, 2021 @ 11:30am . If you are unable to keep this appointment, please call the office to reschedule. Call MD if you develop a fever, or if there is redness, swelling, drainage or pain not relieved by pain medication. No heavy lifting, bending, or straining to move your bowels. Take over the counter stool softeners as needed to prevent constipation which may be caused by pain medication.

## 2020-12-28 NOTE — OCCUPATIONAL THERAPY INITIAL EVALUATION ADULT - PLANNED THERAPY INTERVENTIONS, OT EVAL
ADL retraining/balance training/bed mobility training/motor coordination training/ROM/strengthening/transfer training

## 2020-12-28 NOTE — DISCHARGE NOTE NURSING/CASE MANAGEMENT/SOCIAL WORK - PATIENT PORTAL LINK FT
You can access the FollowMyHealth Patient Portal offered by [facility] by registering at the following website: [email or web address]. By joining [facility]’s FollowMyHealth portal, you will also be able to view your health information using other applications (apps) compatible with our system.

## 2020-12-28 NOTE — PHYSICAL THERAPY INITIAL EVALUATION ADULT - PERTINENT HX OF CURRENT PROBLEM, REHAB EVAL
Patient is 70 year old male admitted with history of right knee OA, presents for s/p right total knee arthroplasty.

## 2020-12-28 NOTE — DISCHARGE NOTE PROVIDER - NSDCMRMEDTOKEN_GEN_ALL_CORE_FT
antacid: 15 milliliter(s) orally 4 times a day, As Needed  aspirin 81 mg oral tablet: 1 tab(s) orally &quot;some days &quot; ; pt denies daily use ; per pt and son Alverto [ via phone 12/21/2020] last asa 2 weeks ago .   atorvastatin 20 mg oral tablet: 1 tab(s) orally once a day AM  famotidine 40 mg oral tablet: 1 tab(s) orally once a day (at bedtime)  losartan-hydrochlorothiazide 50mg-12.5mg oral tablet: 1 tab(s) orally once a day AM  metoclopramide 10 mg oral tablet: 1 tab(s) orally 3 times a day  Mi-Acid oral tablet, chewable: 1 tab(s) orally 4 times a day (after meals and at bedtime), As Needed  Vitamin D2 50,000 intl units (1.25 mg) oral capsule: 1 cap(s) orally once a week (Mondays) x 4 weeks   acetaminophen 500 mg oral tablet: 2 tab(s) orally every 8 hours  aspirin 81 mg oral delayed release tablet: 1 tab(s) orally 2 times a day  atorvastatin 20 mg oral tablet: 1 tab(s) orally once a day AM  bisacodyl 10 mg rectal suppository: 1 suppository(ies) rectal once, As needed, Constipation  famotidine 40 mg oral tablet: 1 tab(s) orally once a day (at bedtime)  gabapentin 100 mg oral capsule: 1 cap(s) orally 3 times a day  hydroCHLOROthiazide 12.5 mg oral capsule: 1 cap(s) orally once a day  losartan 50 mg oral tablet: 1 tab(s) orally once a day  losartan-hydrochlorothiazide 50mg-12.5mg oral tablet: 1 tab(s) orally once a day AM  metoclopramide 10 mg oral tablet: 1 tab(s) orally 3 times a day  Mi-Acid oral tablet, chewable: 1 tab(s) orally 4 times a day (after meals and at bedtime), As Needed  oxyCODONE 10 mg oral tablet: 1 tab(s) orally every 3 hours, As needed, Severe Pain (7 - 10)  oxyCODONE 5 mg oral tablet: 1 tab(s) orally every 3 hours, As needed, Moderate Pain (4 - 6)  polyethylene glycol 3350 oral powder for reconstitution: 17 gram(s) orally once a day  senna oral tablet: 2 tab(s) orally once a day (at bedtime)  traMADol 50 mg oral tablet: 1 tab(s) orally every 6 hours  Vitamin D2 50,000 intl units (1.25 mg) oral capsule: 1 cap(s) orally once a week (Mondays) x 4 weeks

## 2020-12-28 NOTE — DISCHARGE NOTE PROVIDER - NSDCCPTREATMENT_GEN_ALL_CORE_FT
PRINCIPAL PROCEDURE  Procedure: Right total knee arthroplasty  Findings and Treatment: 75 y/o Male presents to [facility] for orthopedic surgery. Patient s/p Right total knee arthroplasty with Dr. Pitts  on 12/28/2020. Patient tolerated the procedure well without any intraoperative complications. Patient tolerated physical therapy well, pain is controlled. Pt is weight bearing as tolerated with cane/walker as needed. Seen by medical attending for continuity of care and management and cleared for safe discharge home. Keep dressing/incision clean, dry and intact. Any suture/staples to be removed on post-op day #14 at office visit. Pt is on  Aspirin 81mg BID for DVT prophylaxis, please take for 6 weeks unless otherwise instructed by your surgeon. Please follow up with Dr. Pitts in 1-2 weeks, call office to make appointment, [phone]. Please follow up with your PMD for continuity of care and management as medications may have changed.

## 2020-12-28 NOTE — DISCHARGE NOTE PROVIDER - NSDCFUADDINST_GEN_ALL_CORE_FT
weight bear as tolerated  DEEP VEIN THROMBOSIS PROPHYLAXIS: Please take aspirin 81mg twice daily as prevention for blood clots for 30 days   WOUND CARE: Do not remove dressing until follow up. Keep dressing/incision clean, dry, and intact.  BATHING: Please do not submerge wound underwater. You may shower and/or sponge bathe. Do not scrub incisions or apply lotions.  ACTIVITY: Your weight bearing status is weight bearing as tolerated. If you are taking narcotic pain medication (such as Percocet), do NOT drive a car, operate machinery or make important decisions.  DIET: Return to your usual diet.  NOTIFY YOUR SURGEON IF: You have any bleeding that does not stop, any pus draining from your wound, any fever (over 100.4 F) or chills, persistent nausea/vomiting, persistent diarrhea, or if your pain is not controlled on your discharge pain medications.  PAIN CONTROL: Please take medication as prescribed. If you have been prescribed a narcotic (such as Percocet), please be aware that narcotic pain medicine can cause extreme nausea and constipation. Drink plenty of water and take diuretics (colace, Miralax) as needed. You can get them from your local pharmacy. If you have been prescribed a narcotic (such as Percocet), please take for severe pain only. You can take up to 8 Tylenol 325 mg a day while taking Percocet. Alternate between taking over the counter Ibuprofen and Tylenol so you are taking pain medication every 3-4 hours if your pain is severe.  FOLLOW-UP:  1. Follow-up with Dr. Braydon Pitts within 1-2 weeks of discharge.  Please call office for appointment

## 2020-12-28 NOTE — PHYSICAL THERAPY INITIAL EVALUATION ADULT - ACTIVE RANGE OF MOTION EXAMINATION, REHAB EVAL
right hip and knee flexion 0-90, ankle WFL/bilateral upper extremity Active ROM was WFL (within functional limits)/Left LE Active ROM was WFL (within functional limits)

## 2020-12-28 NOTE — DISCHARGE NOTE PROVIDER - NSRESEARCHGRANT_OVERRIDEREC_GEN_A_CORE
July 23, 2018       [id number] [id number]    Dear Ms. Jeffery Kelly,    Your procedure is scheduled with Suzanne Cristina MD on December 5, 2018, at [facility].. You can expect to be contacted by the hospital 1 to 3 days prior to the surgery to advise you of your arrival and surgery time. Occasionally these times may change. The address of the facility is:      [facility]  [address], [address] [place] [address]  [phone]    The following appointment(s) have been scheduled for you:     1 day post op with Dr. Suzanne Cristina at the [facility] on December 6, 2018 at 8:15 AM.    1 week post op with Dr. Suzanne Cristina at the [facility] on December 14, 2018 at   8:15 AM.    4 week post op with Dr. Poly Raymond at the [facility] on January 2, 2018 at 8:20 AM.    Here are instructions for your surgery:     Â· Do not eat or drink after midnight the night before your surgery. Â· You will need someone to drive you home and remain with you up to 24 hours after you have been discharged. Â· You are strongly advised to have someone stay with you the night of your surgery. If you live alone, please make arrangements, if possible. Â· The hospital recommends 1 pre op shower the night before your surgery with an antibacterial soap. If you have any questions or need to reschedule, please contact me at the telephone number and extension listed below.      Thank you,  Jessica [phone]  Surgery Scheduler for Suzanne Cristina, [facility]    Enclosures: [address] This is a surgical and/or non-medical patient.

## 2020-12-28 NOTE — DISCHARGE NOTE PROVIDER - CARE PROVIDER_API CALL
Braydon Pitts)  Orthopaedic Surgery  [address] [place] [address], [facility] [address]  [place] [address]  Phone: [phone]  Fax: [phone]  Follow Up Time:

## 2020-12-28 NOTE — DISCHARGE NOTE PROVIDER - NSDCCPCAREPLAN_GEN_ALL_CORE_FT
PRINCIPAL DISCHARGE DIAGNOSIS  Diagnosis: Primary osteoarthritis of right knee  Assessment and Plan of Treatment:

## 2020-12-28 NOTE — PHYSICAL THERAPY INITIAL EVALUATION ADULT - FOLLOWS COMMANDS/ANSWERS QUESTIONS, REHAB EVAL
F/U call to Pt's mom.     RN provided education to Mom, assured her that the Trazodone was safe and frequently used as  PRN medication. \"Mom was concerned after googleling the medication and reading Pt must take regularly or go through withdrawal.\" Mom also questioned needing the Melatonin. RN noted this may not be necessary.     Mom then requested refill for Pt's Focalin.  Pt last seen 3/8/19  Next appt. 6/14/19  Last prescribed 3/8/19 Two months provided. Last to fill on or after 4/8/19. Last filled per PDMP on 4/11/19.    Prescription sent to Pt's pharmacy for Pt's Trazodone 25 mg taking 1-2 nightly as needed. Medication does not come in a 25 mg tablet. Script sent for the 50 mg tablet taking 1/2-1 nightly as needed.    Routing to LAUREN Lagos to authorize refill for the Focalin XR 10 mg capsule taking 1 daily.     100% of the time

## 2020-12-28 NOTE — ASU PREOP CHECKLIST - 1.
warm blanket given White bracelet cut off by the Orthopedic team and Belinda /Alivia OR GALI  and placed in with his belonging. Son, Alverto made aware.

## 2020-12-29 DIAGNOSIS — M17.11 UNILATERAL PRIMARY OSTEOARTHRITIS, RIGHT KNEE: ICD-10-CM

## 2020-12-29 DIAGNOSIS — I10 ESSENTIAL (PRIMARY) HYPERTENSION: ICD-10-CM

## 2020-12-29 DIAGNOSIS — Z29.9 ENCOUNTER FOR PROPHYLACTIC MEASURES, UNSPECIFIED: ICD-10-CM

## 2020-12-29 DIAGNOSIS — E78.00 PURE HYPERCHOLESTEROLEMIA, UNSPECIFIED: ICD-10-CM

## 2020-12-29 DIAGNOSIS — K21.9 GASTRO-ESOPHAGEAL REFLUX DISEASE WITHOUT ESOPHAGITIS: ICD-10-CM

## 2020-12-29 LAB
ANION GAP SERPL CALC-SCNC: 12 MMOL/L — SIGNIFICANT CHANGE UP (ref 7–14)
BUN SERPL-MCNC: 21 MG/DL — SIGNIFICANT CHANGE UP (ref 7–23)
CALCIUM SERPL-MCNC: 9 MG/DL — SIGNIFICANT CHANGE UP (ref 8.4–10.5)
CHLORIDE SERPL-SCNC: 99 MMOL/L — SIGNIFICANT CHANGE UP (ref 98–107)
CO2 SERPL-SCNC: 23 MMOL/L — SIGNIFICANT CHANGE UP (ref 22–31)
CREAT SERPL-MCNC: 1.03 MG/DL — SIGNIFICANT CHANGE UP (ref 0.5–1.3)
GLUCOSE SERPL-MCNC: 150 MG/DL — HIGH (ref 70–99)
HCT VFR BLD CALC: 33.1 % — LOW (ref 39–50)
HGB BLD-MCNC: 11 G/DL — LOW (ref 13–17)
MCHC RBC-ENTMCNC: 29.6 PG — SIGNIFICANT CHANGE UP (ref 27–34)
MCHC RBC-ENTMCNC: 33.2 GM/DL — SIGNIFICANT CHANGE UP (ref 32–36)
MCV RBC AUTO: 89 FL — SIGNIFICANT CHANGE UP (ref 80–100)
NRBC # BLD: 0 /100 WBCS — SIGNIFICANT CHANGE UP
NRBC # FLD: 0 K/UL — SIGNIFICANT CHANGE UP
PLATELET # BLD AUTO: 114 K/UL — LOW (ref 150–400)
POTASSIUM SERPL-MCNC: 3.9 MMOL/L — SIGNIFICANT CHANGE UP (ref 3.5–5.3)
POTASSIUM SERPL-SCNC: 3.9 MMOL/L — SIGNIFICANT CHANGE UP (ref 3.5–5.3)
RBC # BLD: 3.72 M/UL — LOW (ref 4.2–5.8)
RBC # FLD: 13.2 % — SIGNIFICANT CHANGE UP (ref 10.3–14.5)
SARS-COV-2 RNA SPEC QL NAA+PROBE: SIGNIFICANT CHANGE UP
SODIUM SERPL-SCNC: 134 MMOL/L — LOW (ref 135–145)
WBC # BLD: 8.1 K/UL — SIGNIFICANT CHANGE UP (ref 3.8–10.5)
WBC # FLD AUTO: 8.1 K/UL — SIGNIFICANT CHANGE UP (ref 3.8–10.5)

## 2020-12-29 PROCEDURE — 99223 1ST HOSP IP/OBS HIGH 75: CPT | Mod: 57

## 2020-12-29 RX ORDER — OXYCODONE HYDROCHLORIDE 5 MG/1
5 TABLET ORAL
Refills: 0 | Status: DISCONTINUED | OUTPATIENT
Start: 2020-12-29 | End: 2021-01-01

## 2020-12-29 RX ORDER — POLYETHYLENE GLYCOL 3350 17 G/17G
17 POWDER, FOR SOLUTION ORAL DAILY
Refills: 0 | Status: DISCONTINUED | OUTPATIENT
Start: 2020-12-29 | End: 2021-01-01

## 2020-12-29 RX ADMIN — LOSARTAN POTASSIUM 50 MILLIGRAM(S): 100 TABLET, FILM COATED ORAL at 06:08

## 2020-12-29 RX ADMIN — GABAPENTIN 100 MILLIGRAM(S): 400 CAPSULE ORAL at 22:09

## 2020-12-29 RX ADMIN — Medication 12.5 MILLIGRAM(S): at 06:07

## 2020-12-29 RX ADMIN — Medication 81 MILLIGRAM(S): at 17:37

## 2020-12-29 RX ADMIN — ATORVASTATIN CALCIUM 20 MILLIGRAM(S): 80 TABLET, FILM COATED ORAL at 22:09

## 2020-12-29 RX ADMIN — SODIUM CHLORIDE 3 MILLILITER(S): 9 INJECTION INTRAMUSCULAR; INTRAVENOUS; SUBCUTANEOUS at 06:08

## 2020-12-29 RX ADMIN — Medication 100 MILLIGRAM(S): at 00:38

## 2020-12-29 RX ADMIN — Medication 10 MILLIGRAM(S): at 13:54

## 2020-12-29 RX ADMIN — SODIUM CHLORIDE 3 MILLILITER(S): 9 INJECTION INTRAMUSCULAR; INTRAVENOUS; SUBCUTANEOUS at 13:50

## 2020-12-29 RX ADMIN — OXYCODONE HYDROCHLORIDE 10 MILLIGRAM(S): 5 TABLET ORAL at 10:47

## 2020-12-29 RX ADMIN — Medication 1000 MILLIGRAM(S): at 17:37

## 2020-12-29 RX ADMIN — Medication 10 MILLIGRAM(S): at 06:07

## 2020-12-29 RX ADMIN — OXYCODONE HYDROCHLORIDE 10 MILLIGRAM(S): 5 TABLET ORAL at 11:37

## 2020-12-29 RX ADMIN — PANTOPRAZOLE SODIUM 40 MILLIGRAM(S): 20 TABLET, DELAYED RELEASE ORAL at 06:07

## 2020-12-29 RX ADMIN — POLYETHYLENE GLYCOL 3350 17 GRAM(S): 17 POWDER, FOR SOLUTION ORAL at 13:54

## 2020-12-29 RX ADMIN — SENNA PLUS 2 TABLET(S): 8.6 TABLET ORAL at 22:10

## 2020-12-29 RX ADMIN — Medication 1000 MILLIGRAM(S): at 07:55

## 2020-12-29 RX ADMIN — GABAPENTIN 100 MILLIGRAM(S): 400 CAPSULE ORAL at 13:54

## 2020-12-29 RX ADMIN — Medication 10 MILLIGRAM(S): at 22:09

## 2020-12-29 RX ADMIN — TRAMADOL HYDROCHLORIDE 50 MILLIGRAM(S): 50 TABLET ORAL at 18:48

## 2020-12-29 RX ADMIN — TRAMADOL HYDROCHLORIDE 50 MILLIGRAM(S): 50 TABLET ORAL at 00:38

## 2020-12-29 RX ADMIN — Medication 15 MILLIGRAM(S): at 06:07

## 2020-12-29 RX ADMIN — Medication 15 MILLIGRAM(S): at 00:38

## 2020-12-29 RX ADMIN — TRAMADOL HYDROCHLORIDE 50 MILLIGRAM(S): 50 TABLET ORAL at 06:08

## 2020-12-29 RX ADMIN — Medication 1000 MILLIGRAM(S): at 00:38

## 2020-12-29 RX ADMIN — SODIUM CHLORIDE 3 MILLILITER(S): 9 INJECTION INTRAMUSCULAR; INTRAVENOUS; SUBCUTANEOUS at 22:11

## 2020-12-29 RX ADMIN — GABAPENTIN 100 MILLIGRAM(S): 400 CAPSULE ORAL at 06:08

## 2020-12-29 RX ADMIN — Medication 102 MILLIGRAM(S): at 06:08

## 2020-12-29 RX ADMIN — TRAMADOL HYDROCHLORIDE 50 MILLIGRAM(S): 50 TABLET ORAL at 13:54

## 2020-12-29 RX ADMIN — Medication 81 MILLIGRAM(S): at 06:08

## 2020-12-29 NOTE — CONSULT NOTE ADULT - ASSESSMENT
69 y/o M with PMH of HTN, GERD, HLD and arthritis s/p elective right total knee replacement on 12/28

## 2020-12-29 NOTE — CONSULT NOTE ADULT - PROBLEM SELECTOR RECOMMENDATION 9
-s/p elective TKR on 12/28  -Doing well post -operatively  -Vitals stable  -Labs reviewed. Mild post op anemia otherwise unremarakble   -Pain management  -Care per ortho

## 2020-12-29 NOTE — CONSULT NOTE ADULT - SUBJECTIVE AND OBJECTIVE BOX
HPI: 69 y/o M with PMH of HTN, GERD, HLD and arthritis presents for elective right total knee replacement. Reports long standing history of arthritis in both knees however right worse than left. Today reports pain in right knee. No other concerns. Wants to go to rehab. No other concerns today. ROS obtained in [place].     PAST MEDICAL & SURGICAL HISTORY:  Arthritis    GERD (gastroesophageal reflux disease)    Hypercholesterolemia    Hypertension    S/P right inguinal hernia repair  2014 [ approximate]    Review of Systems:   CONSTITUTIONAL: No fever  EYES: No vision changes   ENMT:  No sinus or throat pain  NECK: No pain   RESPIRATORY: No shortness of breath  CARDIOVASCULAR: No chest pain  GASTROINTESTINAL: No abdominal or epigastric pain.  GENITOURINARY: No dysuria  NEUROLOGICAL: No headaches  SKIN: No rash  ENDOCRINE: No h/o DM  MUSCULOSKELETAL: right knee pain  PSYCHIATRIC: No depression  ALLERY AND IMMUNOLOGIC: No hives or eczema    Allergies    No Known Allergies    Intolerances    Social History: Denies smoking or alcohol use    FAMILY HISTORY:  Denies any family history     MEDICATIONS  (STANDING):  acetaminophen   Tablet .. 1000 milliGRAM(s) Oral every 8 hours  aspirin enteric coated 81 milliGRAM(s) Oral two times a day  atorvastatin 20 milliGRAM(s) Oral at bedtime  gabapentin 100 milliGRAM(s) Oral three times a day  hydrochlorothiazide 12.5 milliGRAM(s) Oral daily  HYDROmorphone  Injectable 0.5 milliGRAM(s) IV Push once  influenza  Vaccine (HIGH DOSE) 0.7 milliLiter(s) IntraMuscular once  losartan 50 milliGRAM(s) Oral daily  metoclopramide 10 milliGRAM(s) Oral three times a day  pantoprazole    Tablet 40 milliGRAM(s) Oral before breakfast  polyethylene glycol 3350 17 Gram(s) Oral daily  senna 2 Tablet(s) Oral at bedtime  sodium chloride 0.9% lock flush 3 milliLiter(s) IV Push every 8 hours  traMADol 50 milliGRAM(s) Oral every 6 hours    MEDICATIONS  (PRN):  aluminum hydroxide/magnesium hydroxide/simethicone Suspension 30 milliLiter(s) Oral every 6 hours PRN Dyspepsia  ondansetron Injectable 4 milliGRAM(s) IV Push every 6 hours PRN Nausea and/or Vomiting  oxyCODONE    IR 10 milliGRAM(s) Oral every 3 hours PRN Severe Pain (7 - 10)  oxyCODONE    IR 5 milliGRAM(s) Oral every 3 hours PRN Moderate Pain (4 - 6)      I&O's Summary    28 Dec 2020 07:01  -  29 Dec 2020 07:00  --------------------------------------------------------  IN: 0 mL / OUT: 1800 mL / NET: -1800 mL        PHYSICAL EXAM:  GENERAL: NAD, well-developed  HEAD:  Atraumatic, Normocephalic  EYES: EOMI, PERRLA, conjunctiva and sclera clear  NECK: Supple, No JVD  CHEST/LUNG: Clear to auscultation bilaterally; No wheeze  HEART: Regular rate and rhythm; No murmurs, rubs, or gallops  ABDOMEN: Soft, Nontender, Nondistended; Bowel sounds present  EXTREMITIES:  2+ Peripheral Pulses, No clubbing, cyanosis, or edema; right leg dressing c/d/i  PSYCH: AAOx3  NEUROLOGY: non-focal  SKIN: No rashes or lesions    LABS:                        11.0   8.10  )-----------( 114      ( 29 Dec 2020 07:18 )             33.1     12-29    134<L>  |  99  |  21  ----------------------------<  150<H>  3.9   |  23  |  1.03    Ca    9.0      29 Dec 2020 07:18      RADIOLOGY & ADDITIONAL TESTS: < from: Xray Knee 1 or 2 Views, Right (12.28.20 @ 11:33) >  Unconstrained right total knee prosthesis with longer tibial stem component implanted.    Intact and aligned hardware and no periprosthetic fractures.    Postoperative soft tissue changes.    Correlate with intraoperative findings.    < end of copied text >      Imaging Personally Reviewed:    Consultant(s) Notes Reviewed:      Care Discussed with Consultants/Other Providers: ortho PA    Assessment and Plan:

## 2020-12-30 LAB
ANION GAP SERPL CALC-SCNC: 10 MMOL/L — SIGNIFICANT CHANGE UP (ref 7–14)
BUN SERPL-MCNC: 25 MG/DL — HIGH (ref 7–23)
CALCIUM SERPL-MCNC: 8.9 MG/DL — SIGNIFICANT CHANGE UP (ref 8.4–10.5)
CHLORIDE SERPL-SCNC: 102 MMOL/L — SIGNIFICANT CHANGE UP (ref 98–107)
CO2 SERPL-SCNC: 26 MMOL/L — SIGNIFICANT CHANGE UP (ref 22–31)
CREAT SERPL-MCNC: 1.07 MG/DL — SIGNIFICANT CHANGE UP (ref 0.5–1.3)
GLUCOSE SERPL-MCNC: 109 MG/DL — HIGH (ref 70–99)
HCT VFR BLD CALC: 29.6 % — LOW (ref 39–50)
HGB BLD-MCNC: 10 G/DL — LOW (ref 13–17)
MCHC RBC-ENTMCNC: 29.7 PG — SIGNIFICANT CHANGE UP (ref 27–34)
MCHC RBC-ENTMCNC: 33.8 GM/DL — SIGNIFICANT CHANGE UP (ref 32–36)
MCV RBC AUTO: 87.8 FL — SIGNIFICANT CHANGE UP (ref 80–100)
NRBC # BLD: 0 /100 WBCS — SIGNIFICANT CHANGE UP
NRBC # FLD: 0 K/UL — SIGNIFICANT CHANGE UP
PLATELET # BLD AUTO: 121 K/UL — LOW (ref 150–400)
POTASSIUM SERPL-MCNC: 3.9 MMOL/L — SIGNIFICANT CHANGE UP (ref 3.5–5.3)
POTASSIUM SERPL-SCNC: 3.9 MMOL/L — SIGNIFICANT CHANGE UP (ref 3.5–5.3)
RBC # BLD: 3.37 M/UL — LOW (ref 4.2–5.8)
RBC # FLD: 13.2 % — SIGNIFICANT CHANGE UP (ref 10.3–14.5)
SODIUM SERPL-SCNC: 138 MMOL/L — SIGNIFICANT CHANGE UP (ref 135–145)
WBC # BLD: 8.61 K/UL — SIGNIFICANT CHANGE UP (ref 3.8–10.5)
WBC # FLD AUTO: 8.61 K/UL — SIGNIFICANT CHANGE UP (ref 3.8–10.5)

## 2020-12-30 PROCEDURE — 99232 SBSQ HOSP IP/OBS MODERATE 35: CPT | Mod: 57

## 2020-12-30 RX ORDER — LACTULOSE 10 G/15ML
10 SOLUTION ORAL ONCE
Refills: 0 | Status: COMPLETED | OUTPATIENT
Start: 2020-12-30 | End: 2020-12-30

## 2020-12-30 RX ORDER — MAGNESIUM HYDROXIDE 400 MG/1
30 TABLET, CHEWABLE ORAL ONCE
Refills: 0 | Status: DISCONTINUED | OUTPATIENT
Start: 2020-12-30 | End: 2021-01-01

## 2020-12-30 RX ADMIN — PANTOPRAZOLE SODIUM 40 MILLIGRAM(S): 20 TABLET, DELAYED RELEASE ORAL at 06:07

## 2020-12-30 RX ADMIN — TRAMADOL HYDROCHLORIDE 50 MILLIGRAM(S): 50 TABLET ORAL at 12:00

## 2020-12-30 RX ADMIN — Medication 1000 MILLIGRAM(S): at 06:13

## 2020-12-30 RX ADMIN — OXYCODONE HYDROCHLORIDE 10 MILLIGRAM(S): 5 TABLET ORAL at 06:13

## 2020-12-30 RX ADMIN — GABAPENTIN 100 MILLIGRAM(S): 400 CAPSULE ORAL at 21:13

## 2020-12-30 RX ADMIN — TRAMADOL HYDROCHLORIDE 50 MILLIGRAM(S): 50 TABLET ORAL at 12:34

## 2020-12-30 RX ADMIN — ATORVASTATIN CALCIUM 20 MILLIGRAM(S): 80 TABLET, FILM COATED ORAL at 21:13

## 2020-12-30 RX ADMIN — OXYCODONE HYDROCHLORIDE 10 MILLIGRAM(S): 5 TABLET ORAL at 12:31

## 2020-12-30 RX ADMIN — OXYCODONE HYDROCHLORIDE 10 MILLIGRAM(S): 5 TABLET ORAL at 21:13

## 2020-12-30 RX ADMIN — Medication 10 MILLIGRAM(S): at 21:13

## 2020-12-30 RX ADMIN — Medication 10 MILLIGRAM(S): at 12:34

## 2020-12-30 RX ADMIN — TRAMADOL HYDROCHLORIDE 50 MILLIGRAM(S): 50 TABLET ORAL at 18:48

## 2020-12-30 RX ADMIN — Medication 1000 MILLIGRAM(S): at 15:03

## 2020-12-30 RX ADMIN — Medication 12.5 MILLIGRAM(S): at 06:07

## 2020-12-30 RX ADMIN — SODIUM CHLORIDE 3 MILLILITER(S): 9 INJECTION INTRAMUSCULAR; INTRAVENOUS; SUBCUTANEOUS at 06:02

## 2020-12-30 RX ADMIN — OXYCODONE HYDROCHLORIDE 10 MILLIGRAM(S): 5 TABLET ORAL at 07:13

## 2020-12-30 RX ADMIN — LACTULOSE 10 GRAM(S): 10 SOLUTION ORAL at 11:13

## 2020-12-30 RX ADMIN — GABAPENTIN 100 MILLIGRAM(S): 400 CAPSULE ORAL at 06:07

## 2020-12-30 RX ADMIN — Medication 81 MILLIGRAM(S): at 06:06

## 2020-12-30 RX ADMIN — OXYCODONE HYDROCHLORIDE 10 MILLIGRAM(S): 5 TABLET ORAL at 11:13

## 2020-12-30 RX ADMIN — GABAPENTIN 100 MILLIGRAM(S): 400 CAPSULE ORAL at 15:03

## 2020-12-30 RX ADMIN — OXYCODONE HYDROCHLORIDE 10 MILLIGRAM(S): 5 TABLET ORAL at 22:58

## 2020-12-30 RX ADMIN — Medication 81 MILLIGRAM(S): at 18:48

## 2020-12-30 RX ADMIN — SODIUM CHLORIDE 3 MILLILITER(S): 9 INJECTION INTRAMUSCULAR; INTRAVENOUS; SUBCUTANEOUS at 12:31

## 2020-12-30 RX ADMIN — SODIUM CHLORIDE 3 MILLILITER(S): 9 INJECTION INTRAMUSCULAR; INTRAVENOUS; SUBCUTANEOUS at 21:11

## 2020-12-30 RX ADMIN — POLYETHYLENE GLYCOL 3350 17 GRAM(S): 17 POWDER, FOR SOLUTION ORAL at 12:34

## 2020-12-30 RX ADMIN — LOSARTAN POTASSIUM 50 MILLIGRAM(S): 100 TABLET, FILM COATED ORAL at 06:07

## 2020-12-30 RX ADMIN — Medication 10 MILLIGRAM(S): at 06:07

## 2020-12-31 DIAGNOSIS — R00.0 TACHYCARDIA, UNSPECIFIED: ICD-10-CM

## 2020-12-31 LAB
ANION GAP SERPL CALC-SCNC: 10 MMOL/L — SIGNIFICANT CHANGE UP (ref 7–14)
BLD GP AB SCN SERPL QL: NEGATIVE — SIGNIFICANT CHANGE UP
BUN SERPL-MCNC: 20 MG/DL — SIGNIFICANT CHANGE UP (ref 7–23)
CALCIUM SERPL-MCNC: 9 MG/DL — SIGNIFICANT CHANGE UP (ref 8.4–10.5)
CHLORIDE SERPL-SCNC: 100 MMOL/L — SIGNIFICANT CHANGE UP (ref 98–107)
CO2 SERPL-SCNC: 27 MMOL/L — SIGNIFICANT CHANGE UP (ref 22–31)
CREAT SERPL-MCNC: 1.04 MG/DL — SIGNIFICANT CHANGE UP (ref 0.5–1.3)
D DIMER BLD IA.RAPID-MCNC: 2518 NG/ML DDU — HIGH
GLUCOSE SERPL-MCNC: 96 MG/DL — SIGNIFICANT CHANGE UP (ref 70–99)
HCT VFR BLD CALC: 31.2 % — LOW (ref 39–50)
HGB BLD-MCNC: 10.6 G/DL — LOW (ref 13–17)
MCHC RBC-ENTMCNC: 30.4 PG — SIGNIFICANT CHANGE UP (ref 27–34)
MCHC RBC-ENTMCNC: 34 GM/DL — SIGNIFICANT CHANGE UP (ref 32–36)
MCV RBC AUTO: 89.4 FL — SIGNIFICANT CHANGE UP (ref 80–100)
NRBC # BLD: 0 /100 WBCS — SIGNIFICANT CHANGE UP
NRBC # FLD: 0 K/UL — SIGNIFICANT CHANGE UP
PLATELET # BLD AUTO: 126 K/UL — LOW (ref 150–400)
POTASSIUM SERPL-MCNC: 4.4 MMOL/L — SIGNIFICANT CHANGE UP (ref 3.5–5.3)
POTASSIUM SERPL-SCNC: 4.4 MMOL/L — SIGNIFICANT CHANGE UP (ref 3.5–5.3)
RBC # BLD: 3.49 M/UL — LOW (ref 4.2–5.8)
RBC # FLD: 13.4 % — SIGNIFICANT CHANGE UP (ref 10.3–14.5)
RH IG SCN BLD-IMP: POSITIVE — SIGNIFICANT CHANGE UP
SODIUM SERPL-SCNC: 137 MMOL/L — SIGNIFICANT CHANGE UP (ref 135–145)
TSH SERPL-MCNC: 2.19 UIU/ML — SIGNIFICANT CHANGE UP (ref 0.27–4.2)
WBC # BLD: 5.93 K/UL — SIGNIFICANT CHANGE UP (ref 3.8–10.5)
WBC # FLD AUTO: 5.93 K/UL — SIGNIFICANT CHANGE UP (ref 3.8–10.5)

## 2020-12-31 PROCEDURE — 99233 SBSQ HOSP IP/OBS HIGH 50: CPT

## 2020-12-31 RX ORDER — SIMETHICONE 80 MG/1
80 TABLET, CHEWABLE ORAL THREE TIMES A DAY
Refills: 0 | Status: DISCONTINUED | OUTPATIENT
Start: 2020-12-31 | End: 2021-01-01

## 2020-12-31 RX ORDER — SODIUM CHLORIDE 9 MG/ML
1000 INJECTION INTRAMUSCULAR; INTRAVENOUS; SUBCUTANEOUS ONCE
Refills: 0 | Status: COMPLETED | OUTPATIENT
Start: 2020-12-31 | End: 2020-12-31

## 2020-12-31 RX ADMIN — Medication 81 MILLIGRAM(S): at 06:11

## 2020-12-31 RX ADMIN — SODIUM CHLORIDE 3 MILLILITER(S): 9 INJECTION INTRAMUSCULAR; INTRAVENOUS; SUBCUTANEOUS at 22:03

## 2020-12-31 RX ADMIN — Medication 1000 MILLIGRAM(S): at 00:46

## 2020-12-31 RX ADMIN — TRAMADOL HYDROCHLORIDE 50 MILLIGRAM(S): 50 TABLET ORAL at 12:35

## 2020-12-31 RX ADMIN — Medication 10 MILLIGRAM(S): at 06:11

## 2020-12-31 RX ADMIN — ATORVASTATIN CALCIUM 20 MILLIGRAM(S): 80 TABLET, FILM COATED ORAL at 22:03

## 2020-12-31 RX ADMIN — SODIUM CHLORIDE 1000 MILLILITER(S): 9 INJECTION INTRAMUSCULAR; INTRAVENOUS; SUBCUTANEOUS at 09:56

## 2020-12-31 RX ADMIN — GABAPENTIN 100 MILLIGRAM(S): 400 CAPSULE ORAL at 06:12

## 2020-12-31 RX ADMIN — Medication 1000 MILLIGRAM(S): at 07:19

## 2020-12-31 RX ADMIN — SODIUM CHLORIDE 3 MILLILITER(S): 9 INJECTION INTRAMUSCULAR; INTRAVENOUS; SUBCUTANEOUS at 06:02

## 2020-12-31 RX ADMIN — POLYETHYLENE GLYCOL 3350 17 GRAM(S): 17 POWDER, FOR SOLUTION ORAL at 12:35

## 2020-12-31 RX ADMIN — Medication 1000 MILLIGRAM(S): at 15:02

## 2020-12-31 RX ADMIN — GABAPENTIN 100 MILLIGRAM(S): 400 CAPSULE ORAL at 15:02

## 2020-12-31 RX ADMIN — PANTOPRAZOLE SODIUM 40 MILLIGRAM(S): 20 TABLET, DELAYED RELEASE ORAL at 06:12

## 2020-12-31 RX ADMIN — TRAMADOL HYDROCHLORIDE 50 MILLIGRAM(S): 50 TABLET ORAL at 13:42

## 2020-12-31 RX ADMIN — Medication 10 MILLIGRAM(S): at 15:02

## 2020-12-31 RX ADMIN — Medication 10 MILLIGRAM(S): at 22:03

## 2020-12-31 RX ADMIN — TRAMADOL HYDROCHLORIDE 50 MILLIGRAM(S): 50 TABLET ORAL at 18:34

## 2020-12-31 RX ADMIN — LOSARTAN POTASSIUM 50 MILLIGRAM(S): 100 TABLET, FILM COATED ORAL at 06:11

## 2020-12-31 RX ADMIN — Medication 81 MILLIGRAM(S): at 18:34

## 2020-12-31 RX ADMIN — TRAMADOL HYDROCHLORIDE 50 MILLIGRAM(S): 50 TABLET ORAL at 06:12

## 2020-12-31 RX ADMIN — SENNA PLUS 2 TABLET(S): 8.6 TABLET ORAL at 22:03

## 2020-12-31 RX ADMIN — SODIUM CHLORIDE 3 MILLILITER(S): 9 INJECTION INTRAMUSCULAR; INTRAVENOUS; SUBCUTANEOUS at 11:30

## 2020-12-31 RX ADMIN — SIMETHICONE 80 MILLIGRAM(S): 80 TABLET, CHEWABLE ORAL at 07:19

## 2020-12-31 RX ADMIN — TRAMADOL HYDROCHLORIDE 50 MILLIGRAM(S): 50 TABLET ORAL at 00:46

## 2020-12-31 RX ADMIN — GABAPENTIN 100 MILLIGRAM(S): 400 CAPSULE ORAL at 22:03

## 2020-12-31 RX ADMIN — Medication 12.5 MILLIGRAM(S): at 06:11

## 2020-12-31 RX ADMIN — INFLUENZA VIRUS VACCINE 0.7 MILLILITER(S): 15; 15; 15; 15 SUSPENSION INTRAMUSCULAR at 09:50

## 2021-01-01 VITALS
RESPIRATION RATE: 17 BRPM | TEMPERATURE: 98 F | DIASTOLIC BLOOD PRESSURE: 72 MMHG | OXYGEN SATURATION: 97 % | HEART RATE: 106 BPM | SYSTOLIC BLOOD PRESSURE: 132 MMHG

## 2021-01-01 LAB
APPEARANCE UR: CLEAR — SIGNIFICANT CHANGE UP
BILIRUB UR-MCNC: NEGATIVE — SIGNIFICANT CHANGE UP
COLOR SPEC: SIGNIFICANT CHANGE UP
DIFF PNL FLD: NEGATIVE — SIGNIFICANT CHANGE UP
GLUCOSE UR QL: NEGATIVE — SIGNIFICANT CHANGE UP
KETONES UR-MCNC: NEGATIVE — SIGNIFICANT CHANGE UP
LEUKOCYTE ESTERASE UR-ACNC: NEGATIVE — SIGNIFICANT CHANGE UP
NITRITE UR-MCNC: NEGATIVE — SIGNIFICANT CHANGE UP
PH UR: 7 — SIGNIFICANT CHANGE UP (ref 5–8)
PROT UR-MCNC: NEGATIVE — SIGNIFICANT CHANGE UP
SP GR SPEC: 1.01 — LOW (ref 1.01–1.02)
UROBILINOGEN FLD QL: SIGNIFICANT CHANGE UP

## 2021-01-01 PROCEDURE — 99233 SBSQ HOSP IP/OBS HIGH 50: CPT

## 2021-01-01 PROCEDURE — 71275 CT ANGIOGRAPHY CHEST: CPT | Mod: 26

## 2021-01-01 RX ORDER — LOSARTAN POTASSIUM 100 MG/1
1 TABLET, FILM COATED ORAL
Qty: 0 | Refills: 0 | DISCHARGE
Start: 2021-01-01

## 2021-01-01 RX ORDER — ASPIRIN/CALCIUM CARB/MAGNESIUM 324 MG
1 TABLET ORAL
Qty: 0 | Refills: 0 | DISCHARGE

## 2021-01-01 RX ORDER — OXYCODONE HYDROCHLORIDE 5 MG/1
1 TABLET ORAL
Qty: 0 | Refills: 0 | DISCHARGE
Start: 2021-01-01

## 2021-01-01 RX ORDER — ACETAMINOPHEN 500 MG
2 TABLET ORAL
Qty: 0 | Refills: 0 | DISCHARGE
Start: 2021-01-01

## 2021-01-01 RX ORDER — SENNA PLUS 8.6 MG/1
2 TABLET ORAL
Qty: 0 | Refills: 0 | DISCHARGE
Start: 2021-01-01

## 2021-01-01 RX ORDER — POLYETHYLENE GLYCOL 3350 17 G/17G
17 POWDER, FOR SOLUTION ORAL
Qty: 0 | Refills: 0 | DISCHARGE
Start: 2021-01-01

## 2021-01-01 RX ORDER — LOSARTAN POTASSIUM 100 MG/1
50 TABLET, FILM COATED ORAL DAILY
Refills: 0 | Status: DISCONTINUED | OUTPATIENT
Start: 2021-01-01 | End: 2021-01-01

## 2021-01-01 RX ORDER — TRAMADOL HYDROCHLORIDE 50 MG/1
1 TABLET ORAL
Qty: 0 | Refills: 0 | DISCHARGE
Start: 2021-01-01

## 2021-01-01 RX ORDER — ASPIRIN/CALCIUM CARB/MAGNESIUM 324 MG
1 TABLET ORAL
Qty: 0 | Refills: 0 | DISCHARGE
Start: 2021-01-01

## 2021-01-01 RX ORDER — LACTULOSE 10 G/15ML
10 SOLUTION ORAL ONCE
Refills: 0 | Status: COMPLETED | OUTPATIENT
Start: 2021-01-01 | End: 2021-01-01

## 2021-01-01 RX ORDER — GABAPENTIN 400 MG/1
1 CAPSULE ORAL
Qty: 0 | Refills: 0 | DISCHARGE
Start: 2021-01-01

## 2021-01-01 RX ORDER — HYDROCHLOROTHIAZIDE 25 MG
12.5 TABLET ORAL DAILY
Refills: 0 | Status: DISCONTINUED | OUTPATIENT
Start: 2021-01-01 | End: 2021-01-01

## 2021-01-01 RX ADMIN — SODIUM CHLORIDE 3 MILLILITER(S): 9 INJECTION INTRAMUSCULAR; INTRAVENOUS; SUBCUTANEOUS at 14:10

## 2021-01-01 RX ADMIN — LACTULOSE 10 GRAM(S): 10 SOLUTION ORAL at 13:07

## 2021-01-01 RX ADMIN — OXYCODONE HYDROCHLORIDE 10 MILLIGRAM(S): 5 TABLET ORAL at 04:07

## 2021-01-01 RX ADMIN — TRAMADOL HYDROCHLORIDE 50 MILLIGRAM(S): 50 TABLET ORAL at 07:51

## 2021-01-01 RX ADMIN — LOSARTAN POTASSIUM 50 MILLIGRAM(S): 100 TABLET, FILM COATED ORAL at 07:01

## 2021-01-01 RX ADMIN — Medication 10 MILLIGRAM(S): at 14:34

## 2021-01-01 RX ADMIN — GABAPENTIN 100 MILLIGRAM(S): 400 CAPSULE ORAL at 14:34

## 2021-01-01 RX ADMIN — GABAPENTIN 100 MILLIGRAM(S): 400 CAPSULE ORAL at 07:00

## 2021-01-01 RX ADMIN — Medication 12.5 MILLIGRAM(S): at 07:00

## 2021-01-01 RX ADMIN — Medication 10 MILLIGRAM(S): at 07:01

## 2021-01-01 RX ADMIN — PANTOPRAZOLE SODIUM 40 MILLIGRAM(S): 20 TABLET, DELAYED RELEASE ORAL at 07:01

## 2021-01-01 RX ADMIN — Medication 81 MILLIGRAM(S): at 07:00

## 2021-01-01 RX ADMIN — Medication 1000 MILLIGRAM(S): at 07:50

## 2021-01-01 RX ADMIN — SODIUM CHLORIDE 3 MILLILITER(S): 9 INJECTION INTRAMUSCULAR; INTRAVENOUS; SUBCUTANEOUS at 06:51

## 2021-01-01 RX ADMIN — OXYCODONE HYDROCHLORIDE 10 MILLIGRAM(S): 5 TABLET ORAL at 04:30

## 2021-01-01 NOTE — PROGRESS NOTE ADULT - SUBJECTIVE AND OBJECTIVE BOX
Ortho Post-Op    Patient was seen and examined at bedside. Denies CP/SOB/Dizziness. Pain is controlled.     Vital Signs Last 24 Hrs  T(C): 36.4 (28 Dec 2020 12:30), Max: 36.6 (28 Dec 2020 05:54)  T(F): 97.5 (28 Dec 2020 12:30), Max: 97.9 (28 Dec 2020 05:54)  HR: 68 (28 Dec 2020 13:00) (56 - 73)  BP: 129/79 (28 Dec 2020 13:00) (122/77 - 157/86)  BP(mean): 91 (28 Dec 2020 13:00) (88 - 100)  RR: 14 (28 Dec 2020 13:00) (12 - 19)  SpO2: 97% (28 Dec 2020 13:00) (96% - 100%)    GEN: NAD  RLE: Dressing C/D/I.     Motor intact + EHL/FHL/TA/GS. Sensation is grossly intact distal . Extremity warm. Compartments are soft. DP 1+    Labs:                          13.1   5.64  )-----------( 126      ( 28 Dec 2020 12:06 )             39.7       12-28    136  |  103  |  15  ----------------------------<  103<H>  4.9   |  26  |  0.94    Ca    9.3      28 Dec 2020 12:06        A/P: Patient is a 70y y/o Male s/p R TKA, POD # 0    -Pain control/analgesia  -Inc spirometry  -Foot pumps  -F/U AM Labs  -PT/OT/WBAT  -Antibiotic periop-ancef  -Anticoagulation-A81 BID  -Continue to monitor. Notify ortho with questions.  
Ortho Progress Note    S: Patient seen and examined. No acute events overnight. Pain well controlled with current regimen. Denies lightheadedness/dizziness, CP/SOB. Tolerating diet.   Complains of chronic (before admission) gas, indigestion, and bowel incontinence. Requesting to get medication and speak to a medical attending regarding these concerns.     O:  Physical Exam:  Gen: Laying in bed, NAD, alert and oriented.   Resp: Unlabored breathing  Ext: EHL/FHL/TA/Sol intact          + SILT DP/SP/GENTILE/Sa/Tib          +DP, extremity WWP    Vital Signs Last 24 Hrs  T(C): 37 (31 Dec 2020 00:48), Max: 37.1 (30 Dec 2020 12:28)  T(F): 98.6 (31 Dec 2020 00:48), Max: 98.8 (30 Dec 2020 12:28)  HR: 96 (31 Dec 2020 00:48) (71 - 96)  BP: 124/81 (31 Dec 2020 00:48) (124/81 - 154/85)  BP(mean): --  RR: 17 (31 Dec 2020 00:48) (15 - 17)  SpO2: 96% (31 Dec 2020 00:48) (94% - 98%)                          10.0   8.61  )-----------( 121      ( 30 Dec 2020 06:34 )             29.6                         11.0   8.10  )-----------( 114      ( 29 Dec 2020 07:18 )             33.1       12-30    138  |  102  |  25<H>  ----------------------------<  109<H>  3.9   |  26  |  1.07                
Patient is seen and examined. Denies CP/SOB/DIzziness/N/V/D/HA. Pain is controlled. Translation obtained.    Vital Signs Last 24 Hrs  T(C): 37 (30 Dec 2020 05:57), Max: 37.2 (29 Dec 2020 17:46)  T(F): 98.6 (30 Dec 2020 05:57), Max: 99 (29 Dec 2020 17:46)  HR: 79 (30 Dec 2020 05:57) (65 - 112)  BP: 155/94 (30 Dec 2020 05:57) (101/50 - 165/77)  BP(mean): --  RR: 17 (30 Dec 2020 05:57) (16 - 17)  SpO2: 98% (30 Dec 2020 05:57) (97% - 99%)    Gen: NAD    RLE: Dressing C/D/I   Motor intact RLE. Sensation is grossly intact in the RLE. Compartments are soft, extremities are warm. DP 1+ BL LE.    Labs:                        11.0   8.10  )-----------( 114      ( 29 Dec 2020 07:18 )             33.1       12-29    134<L>  |  99  |  21  ----------------------------<  150<H>  3.9   |  23  |  1.03    Ca    9.0      29 Dec 2020 07:18        A/P: Patient is a 70y Male s/p R total knee arthroplasty, POD # 2    - Pain control / Analgesia  - Anticoagulation  -PT/OT  -WBAT  -OOB  -Inc Spirometry  -Foot Pumps  -F/U AM Labs  -DC rehab today  -Notify Ortho with any questions
Ortho Progress Note    S: Patient seen and examined. No acute events overnight. Pain well controlled with current regimen. Denies lightheadedness/dizziness, CP/SOB. Tolerating diet.       O:  Physical Exam:  Gen: Laying in bed, NAD, alert and oriented.   Resp: Unlabored breathing  Ext: EHL/FHL/TA/Sol intact          + SILT DP/SP/GENTILE/Sa/Tib          +DP, extremity WWP    Vital Signs Last 24 Hrs  T(C): 37 (31 Dec 2020 00:48), Max: 37.1 (30 Dec 2020 12:28)  T(F): 98.6 (31 Dec 2020 00:48), Max: 98.8 (30 Dec 2020 12:28)  HR: 96 (31 Dec 2020 00:48) (71 - 96)  BP: 124/81 (31 Dec 2020 00:48) (124/81 - 154/85)  BP(mean): --  RR: 17 (31 Dec 2020 00:48) (15 - 17)  SpO2: 96% (31 Dec 2020 00:48) (94% - 98%)                          10.0   8.61  )-----------( 121      ( 30 Dec 2020 06:34 )             29.6                         11.0   8.10  )-----------( 114      ( 29 Dec 2020 07:18 )             33.1       12-30    138  |  102  |  25<H>  ----------------------------<  109<H>  3.9   |  26  |  1.07                
Ortho Progress Note    S: Patient seen and examined. No acute events overnight. Pain well controlled with current regimen. Denies lightheadedness/dizziness, CP/SOB. Tolerating diet.      #[id number]    O:  Physical Exam:  Gen: Laying in bed, NAD, alert and oriented.   Resp: Unlabored breathing  Ext: EHL/FHL/TA/Sol intact          + SILT DP/SP/GENTILE/Sa/Tib          +DP, extremity WWP    Vital Signs Last 24 Hrs  T(C): 36.7 (29 Dec 2020 06:00), Max: 37.3 (28 Dec 2020 17:18)  T(F): 98.1 (29 Dec 2020 06:00), Max: 99.2 (28 Dec 2020 17:18)  HR: 77 (29 Dec 2020 06:00) (56 - 84)  BP: 136/74 (29 Dec 2020 06:00) (122/77 - 147/76)  BP(mean): 91 (28 Dec 2020 13:00) (88 - 100)  RR: 16 (29 Dec 2020 06:00) (12 - 19)  SpO2: 96% (29 Dec 2020 06:00) (95% - 100%)                          13.1   5.64  )-----------( 126      ( 28 Dec 2020 12:06 )             39.7       12-28    136  |  103  |  15  ----------------------------<  103<H>  4.9   |  26  |  0.94                
Patient is a 70y old  Male who presents with a chief complaint of R TKR (29 Dec 2020 10:34)      SUBJECTIVE / OVERNIGHT EVENTS: Patient seen and examined. Reports constipation. Denies other complaints. Pain is tolerable.     MEDICATIONS  (STANDING):  acetaminophen   Tablet .. 1000 milliGRAM(s) Oral every 8 hours  aspirin enteric coated 81 milliGRAM(s) Oral two times a day  atorvastatin 20 milliGRAM(s) Oral at bedtime  gabapentin 100 milliGRAM(s) Oral three times a day  hydrochlorothiazide 12.5 milliGRAM(s) Oral daily  HYDROmorphone  Injectable 0.5 milliGRAM(s) IV Push once  influenza  Vaccine (HIGH DOSE) 0.7 milliLiter(s) IntraMuscular once  lactulose Syrup 10 Gram(s) Oral once  losartan 50 milliGRAM(s) Oral daily  metoclopramide 10 milliGRAM(s) Oral three times a day  pantoprazole    Tablet 40 milliGRAM(s) Oral before breakfast  polyethylene glycol 3350 17 Gram(s) Oral daily  senna 2 Tablet(s) Oral at bedtime  sodium chloride 0.9% lock flush 3 milliLiter(s) IV Push every 8 hours  traMADol 50 milliGRAM(s) Oral every 6 hours    MEDICATIONS  (PRN):  aluminum hydroxide/magnesium hydroxide/simethicone Suspension 30 milliLiter(s) Oral every 6 hours PRN Dyspepsia  bisacodyl Suppository 10 milliGRAM(s) Rectal once PRN Constipation  ondansetron Injectable 4 milliGRAM(s) IV Push every 6 hours PRN Nausea and/or Vomiting  oxyCODONE    IR 10 milliGRAM(s) Oral every 3 hours PRN Severe Pain (7 - 10)  oxyCODONE    IR 5 milliGRAM(s) Oral every 3 hours PRN Moderate Pain (4 - 6)      I&O's Summary    29 Dec 2020 07:01  -  30 Dec 2020 07:00  --------------------------------------------------------  IN: 0 mL / OUT: 2175 mL / NET: -2175 mL    30 Dec 2020 07:01  -  30 Dec 2020 11:07  --------------------------------------------------------  IN: 0 mL / OUT: 300 mL / NET: -300 mL    Vital Signs Last 24 Hrs  T(C): 36.7 (30 Dec 2020 09:02), Max: 37.2 (29 Dec 2020 17:46)  T(F): 98.1 (30 Dec 2020 09:02), Max: 99 (29 Dec 2020 17:46)  HR: 89 (30 Dec 2020 09:02) (65 - 102)  BP: 146/64 (30 Dec 2020 09:02) (101/50 - 155/94)  BP(mean): --  RR: 16 (30 Dec 2020 09:02) (16 - 17)  SpO2: 96% (30 Dec 2020 09:02) (96% - 99%)    PHYSICAL EXAM:  GENERAL: NAD, well-developed  HEAD:  Atraumatic, Normocephalic  EYES: EOMI, PERRLA, conjunctiva and sclera clear  NECK: Supple, No JVD  CHEST/LUNG: Clear to auscultation bilaterally; No wheeze  HEART: Regular rate and rhythm; No murmurs, rubs, or gallops  ABDOMEN: Soft, Nontender, Nondistended; Bowel sounds present  EXTREMITIES:  right leg dressing c/d/i  PSYCH: AAOx3  NEUROLOGY: non-focal  SKIN: No rashes or lesions    LABS:                        10.0   8.61  )-----------( 121      ( 30 Dec 2020 06:34 )             29.6     12-30    138  |  102  |  25<H>  ----------------------------<  109<H>  3.9   |  26  |  1.07    Ca    8.9      30 Dec 2020 06:34      RADIOLOGY & ADDITIONAL TESTS:    Imaging Personally Reviewed:    Consultant(s) Notes Reviewed:      Care Discussed with Consultants/Other Providers: ortho PA    Assessment and Plan:
Patient is a 70y old  Male who presents with a chief complaint of R TKR (29 Dec 2020 10:34)      SUBJECTIVE / OVERNIGHT EVENTS: Patient seen and examined. Reports dysuria today. No chest pain or SOB.      MEDICATIONS  (STANDING):  acetaminophen   Tablet .. 1000 milliGRAM(s) Oral every 8 hours  aspirin enteric coated 81 milliGRAM(s) Oral two times a day  atorvastatin 20 milliGRAM(s) Oral at bedtime  gabapentin 100 milliGRAM(s) Oral three times a day  hydrochlorothiazide 12.5 milliGRAM(s) Oral daily  HYDROmorphone  Injectable 0.5 milliGRAM(s) IV Push once  influenza  Vaccine (HIGH DOSE) 0.7 milliLiter(s) IntraMuscular once  lactulose Syrup 10 Gram(s) Oral once  losartan 50 milliGRAM(s) Oral daily  metoclopramide 10 milliGRAM(s) Oral three times a day  pantoprazole    Tablet 40 milliGRAM(s) Oral before breakfast  polyethylene glycol 3350 17 Gram(s) Oral daily  senna 2 Tablet(s) Oral at bedtime  sodium chloride 0.9% lock flush 3 milliLiter(s) IV Push every 8 hours  traMADol 50 milliGRAM(s) Oral every 6 hours    MEDICATIONS  (PRN):  aluminum hydroxide/magnesium hydroxide/simethicone Suspension 30 milliLiter(s) Oral every 6 hours PRN Dyspepsia  bisacodyl Suppository 10 milliGRAM(s) Rectal once PRN Constipation  ondansetron Injectable 4 milliGRAM(s) IV Push every 6 hours PRN Nausea and/or Vomiting  oxyCODONE    IR 10 milliGRAM(s) Oral every 3 hours PRN Severe Pain (7 - 10)  oxyCODONE    IR 5 milliGRAM(s) Oral every 3 hours PRN Moderate Pain (4 - 6)      I&O's Summary    29 Dec 2020 07:01  -  30 Dec 2020 07:00  --------------------------------------------------------  IN: 0 mL / OUT: 2175 mL / NET: -2175 mL    30 Dec 2020 07:01  -  30 Dec 2020 11:07  --------------------------------------------------------  IN: 0 mL / OUT: 300 mL / NET: -300 mL    Vital Signs Last 24 Hrs  T(C): 36.7 (01 Jan 2021 09:18), Max: 37.1 (01 Jan 2021 02:01)  T(F): 98.1 (01 Jan 2021 09:18), Max: 98.8 (01 Jan 2021 02:01)  HR: 94 (01 Jan 2021 09:18) (79 - 95)  BP: 131/79 (01 Jan 2021 09:18) (119/57 - 149/95)  BP(mean): --  RR: 18 (01 Jan 2021 09:18) (18 - 18)  SpO2: 94% (01 Jan 2021 09:18) (94% - 98%)    PHYSICAL EXAM:  GENERAL: NAD, well-developed  HEAD:  Atraumatic, Normocephalic  EYES: EOMI, PERRLA, conjunctiva and sclera clear  NECK: Supple, No JVD  CHEST/LUNG: Clear to auscultation bilaterally; No wheeze  HEART: tachy  ABDOMEN: Soft, Nontender, Nondistended; Bowel sounds present  EXTREMITIES:  right leg dressing c/d/i  PSYCH: AAOx3  NEUROLOGY: non-focal  SKIN: No rashes or lesions    LABS:                                   10.6   5.93  )-----------( 126      ( 31 Dec 2020 15:27 )             31.2   12-31    137  |  100  |  20  ----------------------------<  96  4.4   |  27  |  1.04    Ca    9.0      31 Dec 2020 15:27        RADIOLOGY & ADDITIONAL TESTS:    Imaging Personally Reviewed:    Consultant(s) Notes Reviewed:      Care Discussed with Consultants/Other Providers: ortho PA    Assessment and Plan:
Patient is a 70y old  Male who presents with a chief complaint of R TKR (29 Dec 2020 10:34)      SUBJECTIVE / OVERNIGHT EVENTS: Patient seen and examined. Elevated HR today. Denies chest pain, SOB or dizziness. No complaints. Pain is 6/10. No cough, URI symptoms or diarrhea. No fever. Constipation resolved yesterday. Spoke to PCP office - not on beta blocker. At the office HR in 60s. Patient overall asymptomatic.     MEDICATIONS  (STANDING):  acetaminophen   Tablet .. 1000 milliGRAM(s) Oral every 8 hours  aspirin enteric coated 81 milliGRAM(s) Oral two times a day  atorvastatin 20 milliGRAM(s) Oral at bedtime  gabapentin 100 milliGRAM(s) Oral three times a day  hydrochlorothiazide 12.5 milliGRAM(s) Oral daily  HYDROmorphone  Injectable 0.5 milliGRAM(s) IV Push once  influenza  Vaccine (HIGH DOSE) 0.7 milliLiter(s) IntraMuscular once  lactulose Syrup 10 Gram(s) Oral once  losartan 50 milliGRAM(s) Oral daily  metoclopramide 10 milliGRAM(s) Oral three times a day  pantoprazole    Tablet 40 milliGRAM(s) Oral before breakfast  polyethylene glycol 3350 17 Gram(s) Oral daily  senna 2 Tablet(s) Oral at bedtime  sodium chloride 0.9% lock flush 3 milliLiter(s) IV Push every 8 hours  traMADol 50 milliGRAM(s) Oral every 6 hours    MEDICATIONS  (PRN):  aluminum hydroxide/magnesium hydroxide/simethicone Suspension 30 milliLiter(s) Oral every 6 hours PRN Dyspepsia  bisacodyl Suppository 10 milliGRAM(s) Rectal once PRN Constipation  ondansetron Injectable 4 milliGRAM(s) IV Push every 6 hours PRN Nausea and/or Vomiting  oxyCODONE    IR 10 milliGRAM(s) Oral every 3 hours PRN Severe Pain (7 - 10)  oxyCODONE    IR 5 milliGRAM(s) Oral every 3 hours PRN Moderate Pain (4 - 6)      I&O's Summary    29 Dec 2020 07:01  -  30 Dec 2020 07:00  --------------------------------------------------------  IN: 0 mL / OUT: 2175 mL / NET: -2175 mL    30 Dec 2020 07:01  -  30 Dec 2020 11:07  --------------------------------------------------------  IN: 0 mL / OUT: 300 mL / NET: -300 mL    Vital Signs Last 24 Hrs  T(C): 37 (31 Dec 2020 08:55), Max: 37.1 (30 Dec 2020 12:28)  T(F): 98.6 (31 Dec 2020 08:55), Max: 98.8 (30 Dec 2020 12:28)  HR: 111 (31 Dec 2020 09:47) (71 - 115)  BP: 139/66 (31 Dec 2020 09:47) (124/81 - 154/85)  BP(mean): --  RR: 18 (31 Dec 2020 09:47) (15 - 18)  SpO2: 99% (31 Dec 2020 09:47) (94% - 99%)    PHYSICAL EXAM:  GENERAL: NAD, well-developed  HEAD:  Atraumatic, Normocephalic  EYES: EOMI, PERRLA, conjunctiva and sclera clear  NECK: Supple, No JVD  CHEST/LUNG: Clear to auscultation bilaterally; No wheeze  HEART: tachy  ABDOMEN: Soft, Nontender, Nondistended; Bowel sounds present  EXTREMITIES:  right leg dressing c/d/i  PSYCH: AAOx3  NEUROLOGY: non-focal  SKIN: No rashes or lesions    LABS:                        10.0   8.61  )-----------( 121      ( 30 Dec 2020 06:34 )             29.6     12-30    138  |  102  |  25<H>  ----------------------------<  109<H>  3.9   |  26  |  1.07    Ca    8.9      30 Dec 2020 06:34      RADIOLOGY & ADDITIONAL TESTS:    Imaging Personally Reviewed:    Consultant(s) Notes Reviewed:      Care Discussed with Consultants/Other Providers: ortho PA    Assessment and Plan:

## 2021-01-01 NOTE — PROGRESS NOTE ADULT - PROBLEM SELECTOR PLAN 1
-s/p elective TKR on 12/28  -Doing well post -operatively  -Vitals stable  -Labs reviewed. Mild post op anemia otherwise unremarakble   -Pain management  -Care per ortho.
-EKG reviewed and appears sinus  -Asymptomatic - no chest pain, SOB or dizziness  -Reports dysuria today - check UA, if positive start macrobid 100BID x 7days  -Pain relatively well controlled. Reports 6/10   -Does not appear dehydrated. Has been eating well.   -TSH WNL  -HgB dropped from 13 baseline to 10.6? Ortho notified. NO signs of GIB and likely post-op blood loss  -CTPA today to r/o PE. If PE identified, recommend Eliquis 10mg [id number] followed by 5mg BID for 3 months.
-EKG reviewed and appears sinus  -Asymptomatic - no chest pain, SOB or dizziness  -No signs of infection  -Pain relatively well controlled. Reports 6/10   -Does not appear dehydrated. Has been eating well. Will do trial of IVF and assess improvement in HR  -TSH WNL  -HgB dropped from 13 baseline to 10? Ortho notified. NO signs of GIB  -Recommend IVF trial - if no improvement, check labs including D-Dimer. If D-dimer elevated, check CTPA to r/o PE given patient has been sedentary recently due to knee pain

## 2021-01-01 NOTE — PROGRESS NOTE ADULT - PROBLEM SELECTOR PLAN 2
-s/p elective TKR on 12/28  -Doing well post -operatively  -Pain management  -Care per ortho.
-BP acceptable this AM  -C/w home losartan and HCTZ.
-s/p elective TKR on 12/28  -Doing well post -operatively  -Pain management  -Care per ortho.

## 2021-01-01 NOTE — PROGRESS NOTE ADULT - ASSESSMENT
70M sp R TKA    ·	Neuro: Pain control  ·	Resp: IS  ·	GI: Regular diet, bowel reg  ·	MSK: WBAT, PT/OT  ·	Heme: DVT PPX  ·	Dispo: rehab    Ortho [id number]
70M sp R TKA    ·	Neuro: Pain control  ·	Resp: IS  ·	GI: Regular diet, bowel reg, will start simethicone for gas and refer to medicine for workup re incontinence  ·	MSK: WBAT, PT/OT  ·	Heme: DVT PPX  ·	Dispo: rehab    Ortho [id number]
71 y/o M with PMH of HTN, GERD, HLD and arthritis s/p elective right total knee replacement on 12/28
70M sp R TKA    Neuro: Pain control  Resp: IS  GI: Regular diet, bowel reg  MSK: WBAT, PT/OT  Heme: DVT PPX  Dispo: Home pending stairs    Ortho [id number]
69 y/o M with PMH of HTN, GERD, HLD and arthritis s/p elective right total knee replacement on 12/28
69 y/o M with PMH of HTN, GERD, HLD and arthritis s/p elective right total knee replacement on 12/28

## 2021-01-01 NOTE — PROGRESS NOTE ADULT - PROBLEM SELECTOR PLAN 3
-C/w famotidine
-BP acceptable this AM  -C/w home losartan and HCTZ.
-BP acceptable this AM  -C/w home losartan and HCTZ.

## 2021-01-04 LAB — SURGICAL PATHOLOGY STUDY: SIGNIFICANT CHANGE UP

## 2021-01-11 ENCOUNTER — APPOINTMENT (OUTPATIENT)
Dept: ORTHOPEDIC SURGERY | Facility: CLINIC | Age: 71
End: 2021-01-11

## 2021-01-11 PROBLEM — K21.9 GASTRO-ESOPHAGEAL REFLUX DISEASE WITHOUT ESOPHAGITIS: Chronic | Status: ACTIVE | Noted: 2020-12-21

## 2021-01-11 PROBLEM — I10 ESSENTIAL (PRIMARY) HYPERTENSION: Chronic | Status: ACTIVE | Noted: 2020-12-21

## 2021-01-12 ENCOUNTER — APPOINTMENT (OUTPATIENT)
Dept: ORTHOPEDIC SURGERY | Facility: CLINIC | Age: 71
End: 2021-01-12
Payer: MEDICAID

## 2021-01-12 ENCOUNTER — APPOINTMENT (OUTPATIENT)
Dept: ORTHOPEDIC SURGERY | Facility: CLINIC | Age: 71
End: 2021-01-12

## 2021-01-12 PROCEDURE — 73562 X-RAY EXAM OF KNEE 3: CPT | Mod: RT

## 2021-01-12 PROCEDURE — 99024 POSTOP FOLLOW-UP VISIT: CPT

## 2021-01-12 NOTE — HISTORY OF PRESENT ILLNESS
[___ Weeks Post Op] : [unfilled] weeks post op [Clean/Dry/Intact] : clean, dry and intact [Healed] : healed [Constipation] : no constipation [Chills] : no chills [Diarrhea] : no diarrhea [Dysuria] : no dysuria [Fever] : no fever [Nausea] : no nausea [Vomiting] : no vomiting [Erythema] : not erythematous [Discharge] : absent of discharge [Swelling] : not swollen [Dehiscence] : not dehisced [de-identified] : Pt returns for his first post op right TKR pt has a dry mepolix dressing over the right knee.Pt is residing at [facility], it is difficult to communicate with the pt his son is not here to translate, translation assistance was done through [place]. [de-identified] : Physical examination of the right knee discloses well approximated incision.  No acute erythema or effusions.  No significant soft tissue swelling to the lower extremity.  Negative Homans.  Range of motion is limited between 10 to 70 degrees flexion. [de-identified] : X-rays of the right knee disclose maintained position and integrity of the total knee implants as seen in AP lateral and skyline projections. [de-identified] : Stable postop right total knee arthroplasty [de-identified] : The patient will hopefully transition from inpatient rehab to home and outpatient program of postop physical therapy.  Reevaluation in 3 weeks to check his progress.

## 2021-01-29 NOTE — H&P PST ADULT - SOURCE OF INFORMATION, PROFILE
Jacque did not have any questions/concerns regarding the AVS.     Status:  Having some relief with the Prednisone, but this is the third time being on this medication.    Medication/s:  None were prescribed in the ED.    F/U Appointment/s:  Set up for 2/1 with Family Practice.     [facility] # [id number]/patient

## 2021-02-02 ENCOUNTER — APPOINTMENT (OUTPATIENT)
Dept: ORTHOPEDIC SURGERY | Facility: CLINIC | Age: 71
End: 2021-02-02
Payer: MEDICAID

## 2021-02-02 PROCEDURE — 99024 POSTOP FOLLOW-UP VISIT: CPT

## 2021-02-02 NOTE — HISTORY OF PRESENT ILLNESS
[___ Weeks Post Op] : [unfilled] weeks post op [2] : the patient reports pain that is 2/10 in severity [Clean/Dry/Intact] : clean, dry and intact [Healed] : healed [Chills] : no chills [Constipation] : no constipation [Diarrhea] : no diarrhea [Dysuria] : no dysuria [Fever] : no fever [Nausea] : no nausea [Vomiting] : no vomiting [Erythema] : not erythematous [Discharge] : absent of discharge [Swelling] : not swollen [Dehiscence] : not dehisced [de-identified] : Pt returns for his post op visit for his Right TKR, pt surgical intervention 12/28/2021. Pt is doing well he is not attending physical therapy.  Pt is ambulating with a cane.Pt is taking ASA daily pt is not taking any pain medication. [de-identified] : Physical examination of the right knee discloses well-healed incision.  No acute effusions or erythema.  Range of motion is to 90 degrees flexion\par Examination of the left knee discloses crepitus with tenderness to arc of flexion between 5 to 110 degrees. [de-identified] : X-rays taken [de-identified] : Stable postop right total knee arthroplasty\par End-stage degenerative arthritis of the left knee [de-identified] : The patient and his son were advised of the findings.  He is requesting that we schedule him for his left total knee surgery as soon as possible.  In the interim he was taught home flexion exercises again and will also resume outpatient physical therapy for his right knee.  He will continue ASA for DVT prophylaxis.

## 2021-02-23 ENCOUNTER — OUTPATIENT (OUTPATIENT)
Dept: OUTPATIENT SERVICES | Facility: HOSPITAL | Age: 71
LOS: 1 days | End: 2021-02-23

## 2021-02-23 VITALS
DIASTOLIC BLOOD PRESSURE: 80 MMHG | OXYGEN SATURATION: 99 % | HEIGHT: 67 IN | RESPIRATION RATE: 18 BRPM | SYSTOLIC BLOOD PRESSURE: 130 MMHG | WEIGHT: 143.96 LBS | HEART RATE: 70 BPM | TEMPERATURE: 97 F

## 2021-02-23 DIAGNOSIS — M17.12 UNILATERAL PRIMARY OSTEOARTHRITIS, LEFT KNEE: ICD-10-CM

## 2021-02-23 DIAGNOSIS — Z98.890 OTHER SPECIFIED POSTPROCEDURAL STATES: Chronic | ICD-10-CM

## 2021-02-23 DIAGNOSIS — M25.562 PAIN IN LEFT KNEE: ICD-10-CM

## 2021-02-23 DIAGNOSIS — Z96.651 PRESENCE OF RIGHT ARTIFICIAL KNEE JOINT: Chronic | ICD-10-CM

## 2021-02-23 LAB
ANION GAP SERPL CALC-SCNC: 7 MMOL/L — SIGNIFICANT CHANGE UP (ref 7–14)
APPEARANCE UR: CLEAR — SIGNIFICANT CHANGE UP
BILIRUB UR-MCNC: NEGATIVE — SIGNIFICANT CHANGE UP
BLD GP AB SCN SERPL QL: NEGATIVE — SIGNIFICANT CHANGE UP
BUN SERPL-MCNC: 12 MG/DL — SIGNIFICANT CHANGE UP (ref 7–23)
CALCIUM SERPL-MCNC: 10.1 MG/DL — SIGNIFICANT CHANGE UP (ref 8.4–10.5)
CHLORIDE SERPL-SCNC: 104 MMOL/L — SIGNIFICANT CHANGE UP (ref 98–107)
CO2 SERPL-SCNC: 28 MMOL/L — SIGNIFICANT CHANGE UP (ref 22–31)
COLOR SPEC: SIGNIFICANT CHANGE UP
CREAT SERPL-MCNC: 0.98 MG/DL — SIGNIFICANT CHANGE UP (ref 0.5–1.3)
DIFF PNL FLD: NEGATIVE — SIGNIFICANT CHANGE UP
GLUCOSE SERPL-MCNC: 85 MG/DL — SIGNIFICANT CHANGE UP (ref 70–99)
GLUCOSE UR QL: NEGATIVE — SIGNIFICANT CHANGE UP
HCT VFR BLD CALC: 42 % — SIGNIFICANT CHANGE UP (ref 39–50)
HGB BLD-MCNC: 13.4 G/DL — SIGNIFICANT CHANGE UP (ref 13–17)
KETONES UR-MCNC: NEGATIVE — SIGNIFICANT CHANGE UP
LEUKOCYTE ESTERASE UR-ACNC: NEGATIVE — SIGNIFICANT CHANGE UP
MCHC RBC-ENTMCNC: 29.1 PG — SIGNIFICANT CHANGE UP (ref 27–34)
MCHC RBC-ENTMCNC: 31.9 GM/DL — LOW (ref 32–36)
MCV RBC AUTO: 91.3 FL — SIGNIFICANT CHANGE UP (ref 80–100)
NITRITE UR-MCNC: NEGATIVE — SIGNIFICANT CHANGE UP
NRBC # BLD: 0 /100 WBCS — SIGNIFICANT CHANGE UP
NRBC # FLD: 0 K/UL — SIGNIFICANT CHANGE UP
PH UR: 6 — SIGNIFICANT CHANGE UP (ref 5–8)
PLATELET # BLD AUTO: 179 K/UL — SIGNIFICANT CHANGE UP (ref 150–400)
POTASSIUM SERPL-MCNC: 4.8 MMOL/L — SIGNIFICANT CHANGE UP (ref 3.5–5.3)
POTASSIUM SERPL-SCNC: 4.8 MMOL/L — SIGNIFICANT CHANGE UP (ref 3.5–5.3)
PROT UR-MCNC: NEGATIVE — SIGNIFICANT CHANGE UP
RBC # BLD: 4.6 M/UL — SIGNIFICANT CHANGE UP (ref 4.2–5.8)
RBC # FLD: 13.2 % — SIGNIFICANT CHANGE UP (ref 10.3–14.5)
RH IG SCN BLD-IMP: POSITIVE — SIGNIFICANT CHANGE UP
SODIUM SERPL-SCNC: 139 MMOL/L — SIGNIFICANT CHANGE UP (ref 135–145)
SP GR SPEC: 1.01 — LOW (ref 1.01–1.02)
UROBILINOGEN FLD QL: SIGNIFICANT CHANGE UP
WBC # BLD: 6.06 K/UL — SIGNIFICANT CHANGE UP (ref 3.8–10.5)
WBC # FLD AUTO: 6.06 K/UL — SIGNIFICANT CHANGE UP (ref 3.8–10.5)

## 2021-02-23 RX ORDER — LOSARTAN/HYDROCHLOROTHIAZIDE 100MG-25MG
1 TABLET ORAL
Qty: 0 | Refills: 0 | DISCHARGE

## 2021-02-23 RX ORDER — FAMOTIDINE 10 MG/ML
1 INJECTION INTRAVENOUS
Qty: 0 | Refills: 0 | DISCHARGE

## 2021-02-23 RX ORDER — METOCLOPRAMIDE HCL 10 MG
1 TABLET ORAL
Qty: 0 | Refills: 0 | DISCHARGE

## 2021-02-23 RX ORDER — ERGOCALCIFEROL 1.25 MG/1
1 CAPSULE ORAL
Qty: 0 | Refills: 0 | DISCHARGE

## 2021-02-23 RX ORDER — ATORVASTATIN CALCIUM 80 MG/1
1 TABLET, FILM COATED ORAL
Qty: 0 | Refills: 0 | DISCHARGE

## 2021-02-23 NOTE — H&P PST ADULT - DENTITION
pt denies loose teeth, pt. has a partial upper denture that he pulled out while giving the information to show, has possible bridge

## 2021-02-23 NOTE — H&P PST ADULT - NSICDXPASTMEDICALHX_GEN_ALL_CORE_FT
PAST MEDICAL HISTORY:  GERD (gastroesophageal reflux disease)     H/O: glaucoma     Hyperlipidemia     Hypertension     OA (osteoarthritis) of knee

## 2021-02-23 NOTE — H&P PST ADULT - NSANTHOSAYNRD_GEN_A_CORE
No. WAQAR screening performed.  STOP BANG Legend: 0-2 = LOW Risk; 3-4 = INTERMEDIATE Risk; 5-8 = HIGH Risk

## 2021-02-23 NOTE — H&P PST ADULT - ASSESSMENT
Pt. is a 69 yo male with chronic left knee pain.  Pt. is a 69 yo male with chronic left knee pain.     Pt. continuously responded to questions with answers that did not fit what was asked.  Pt. several times cut NP off by talking while NP was talking.  Instructed  more than once to tell pt. to not talk over the NP.

## 2021-02-23 NOTE — H&P PST ADULT - NSICDXPASTSURGICALHX_GEN_ALL_CORE_FT
PAST SURGICAL HISTORY:  S/P right inguinal hernia repair 2014 [ approximate]    S/P total knee replacement, right 12/2020

## 2021-02-23 NOTE — H&P PST ADULT - NSICDXPROBLEM_GEN_ALL_CORE_FT
PROBLEM DIAGNOSES  Problem: Left knee pain  Assessment and Plan: Pt. is scheduled for a left total knee replacement 3/11/21.  Pt. verbalized understanding of instructions and that Chlorhexidine is for external use.  Pt. instructed to contact the Surgeon to have COVID test scheduled.  Pt. is scheduled for medical clearance as requested by the Surgeon.  Pt. had a right total knee 12/2020.        PROBLEM DIAGNOSES  Problem: Left knee pain  Assessment and Plan: Pt. is scheduled for a left total knee replacement 3/11/21.  Pt. verbalized understanding of instructions and that Chlorhexidine is for external use.  Pt. instructed to contact the Surgeon to have COVID test scheduled.  Pt. is scheduled for medical clearance as requested by the Surgeon.  Pt. had a right total knee 12/2020.  OR booking notified of WAQAR precautions.

## 2021-02-24 LAB
CULTURE RESULTS: SIGNIFICANT CHANGE UP
MRSA PCR RESULT.: SIGNIFICANT CHANGE UP
S AUREUS DNA NOSE QL NAA+PROBE: SIGNIFICANT CHANGE UP
SPECIMEN SOURCE: SIGNIFICANT CHANGE UP

## 2021-03-08 ENCOUNTER — APPOINTMENT (OUTPATIENT)
Dept: DISASTER EMERGENCY | Facility: CLINIC | Age: 71
End: 2021-03-08

## 2021-03-09 LAB — SARS-COV-2 N GENE NPH QL NAA+PROBE: NOT DETECTED

## 2021-03-10 ENCOUNTER — TRANSCRIPTION ENCOUNTER (OUTPATIENT)
Age: 71
End: 2021-03-10

## 2021-03-10 NOTE — ASU PATIENT PROFILE, ADULT - PMH
GERD (gastroesophageal reflux disease)    H/O: glaucoma    Hyperlipidemia    Hypertension    OA (osteoarthritis) of knee

## 2021-03-11 ENCOUNTER — APPOINTMENT (OUTPATIENT)
Dept: ORTHOPEDIC SURGERY | Facility: HOSPITAL | Age: 71
End: 2021-03-11

## 2021-03-11 ENCOUNTER — INPATIENT (INPATIENT)
Facility: HOSPITAL | Age: 71
LOS: 5 days | Discharge: NURSING, REHAB OR LONG-TERM CARE | End: 2021-03-17
Attending: ORTHOPAEDIC SURGERY | Admitting: ORTHOPAEDIC SURGERY
Payer: MEDICAID

## 2021-03-11 ENCOUNTER — RESULT REVIEW (OUTPATIENT)
Age: 71
End: 2021-03-11

## 2021-03-11 VITALS
DIASTOLIC BLOOD PRESSURE: 76 MMHG | HEIGHT: 67 IN | TEMPERATURE: 98 F | RESPIRATION RATE: 16 BRPM | HEART RATE: 75 BPM | SYSTOLIC BLOOD PRESSURE: 146 MMHG | OXYGEN SATURATION: 98 % | WEIGHT: 143.96 LBS

## 2021-03-11 DIAGNOSIS — Z98.890 OTHER SPECIFIED POSTPROCEDURAL STATES: Chronic | ICD-10-CM

## 2021-03-11 DIAGNOSIS — M17.12 UNILATERAL PRIMARY OSTEOARTHRITIS, LEFT KNEE: ICD-10-CM

## 2021-03-11 DIAGNOSIS — Z96.651 PRESENCE OF RIGHT ARTIFICIAL KNEE JOINT: Chronic | ICD-10-CM

## 2021-03-11 LAB
ANION GAP SERPL CALC-SCNC: 10 MMOL/L — SIGNIFICANT CHANGE UP (ref 7–14)
BUN SERPL-MCNC: 15 MG/DL — SIGNIFICANT CHANGE UP (ref 7–23)
CALCIUM SERPL-MCNC: 9 MG/DL — SIGNIFICANT CHANGE UP (ref 8.4–10.5)
CHLORIDE SERPL-SCNC: 103 MMOL/L — SIGNIFICANT CHANGE UP (ref 98–107)
CO2 SERPL-SCNC: 25 MMOL/L — SIGNIFICANT CHANGE UP (ref 22–31)
CREAT SERPL-MCNC: 1.02 MG/DL — SIGNIFICANT CHANGE UP (ref 0.5–1.3)
GLUCOSE SERPL-MCNC: 160 MG/DL — HIGH (ref 70–99)
HCT VFR BLD CALC: 37.8 % — LOW (ref 39–50)
HGB BLD-MCNC: 12.2 G/DL — LOW (ref 13–17)
MCHC RBC-ENTMCNC: 28.9 PG — SIGNIFICANT CHANGE UP (ref 27–34)
MCHC RBC-ENTMCNC: 32.3 GM/DL — SIGNIFICANT CHANGE UP (ref 32–36)
MCV RBC AUTO: 89.6 FL — SIGNIFICANT CHANGE UP (ref 80–100)
NRBC # BLD: 0 /100 WBCS — SIGNIFICANT CHANGE UP
NRBC # FLD: 0 K/UL — SIGNIFICANT CHANGE UP
PLATELET # BLD AUTO: 131 K/UL — LOW (ref 150–400)
POTASSIUM SERPL-MCNC: 3.8 MMOL/L — SIGNIFICANT CHANGE UP (ref 3.5–5.3)
POTASSIUM SERPL-SCNC: 3.8 MMOL/L — SIGNIFICANT CHANGE UP (ref 3.5–5.3)
RBC # BLD: 4.22 M/UL — SIGNIFICANT CHANGE UP (ref 4.2–5.8)
RBC # FLD: 13.1 % — SIGNIFICANT CHANGE UP (ref 10.3–14.5)
SODIUM SERPL-SCNC: 138 MMOL/L — SIGNIFICANT CHANGE UP (ref 135–145)
WBC # BLD: 7.6 K/UL — SIGNIFICANT CHANGE UP (ref 3.8–10.5)
WBC # FLD AUTO: 7.6 K/UL — SIGNIFICANT CHANGE UP (ref 3.8–10.5)

## 2021-03-11 PROCEDURE — 73560 X-RAY EXAM OF KNEE 1 OR 2: CPT | Mod: 26,LT

## 2021-03-11 PROCEDURE — 88305 TISSUE EXAM BY PATHOLOGIST: CPT | Mod: 26

## 2021-03-11 PROCEDURE — 88311 DECALCIFY TISSUE: CPT | Mod: 26

## 2021-03-11 RX ORDER — OXYCODONE HYDROCHLORIDE 5 MG/1
10 TABLET ORAL
Refills: 0 | Status: DISCONTINUED | OUTPATIENT
Start: 2021-03-11 | End: 2021-03-17

## 2021-03-11 RX ORDER — HYDROMORPHONE HYDROCHLORIDE 2 MG/ML
1 INJECTION INTRAMUSCULAR; INTRAVENOUS; SUBCUTANEOUS
Refills: 0 | Status: DISCONTINUED | OUTPATIENT
Start: 2021-03-11 | End: 2021-03-11

## 2021-03-11 RX ORDER — FERROUS SULFATE 325(65) MG
325 TABLET ORAL DAILY
Refills: 0 | Status: DISCONTINUED | OUTPATIENT
Start: 2021-03-11 | End: 2021-03-17

## 2021-03-11 RX ORDER — SODIUM CHLORIDE 9 MG/ML
1000 INJECTION, SOLUTION INTRAVENOUS
Refills: 0 | Status: DISCONTINUED | OUTPATIENT
Start: 2021-03-11 | End: 2021-03-13

## 2021-03-11 RX ORDER — SODIUM CHLORIDE 9 MG/ML
1000 INJECTION, SOLUTION INTRAVENOUS
Refills: 0 | Status: DISCONTINUED | OUTPATIENT
Start: 2021-03-11 | End: 2021-03-11

## 2021-03-11 RX ORDER — INFLUENZA VIRUS VACCINE 15; 15; 15; 15 UG/.5ML; UG/.5ML; UG/.5ML; UG/.5ML
0.7 SUSPENSION INTRAMUSCULAR ONCE
Refills: 0 | Status: COMPLETED | OUTPATIENT
Start: 2021-03-11 | End: 2021-03-11

## 2021-03-11 RX ORDER — SODIUM CHLORIDE 9 MG/ML
500 INJECTION INTRAMUSCULAR; INTRAVENOUS; SUBCUTANEOUS ONCE
Refills: 0 | Status: COMPLETED | OUTPATIENT
Start: 2021-03-11 | End: 2021-03-11

## 2021-03-11 RX ORDER — PANTOPRAZOLE SODIUM 20 MG/1
40 TABLET, DELAYED RELEASE ORAL
Refills: 0 | Status: DISCONTINUED | OUTPATIENT
Start: 2021-03-11 | End: 2021-03-17

## 2021-03-11 RX ORDER — INFLUENZA VIRUS VACCINE 15; 15; 15; 15 UG/.5ML; UG/.5ML; UG/.5ML; UG/.5ML
0.5 SUSPENSION INTRAMUSCULAR ONCE
Refills: 0 | Status: DISCONTINUED | OUTPATIENT
Start: 2021-03-11 | End: 2021-03-11

## 2021-03-11 RX ORDER — KETOROLAC TROMETHAMINE 30 MG/ML
15 SYRINGE (ML) INJECTION EVERY 6 HOURS
Refills: 0 | Status: DISCONTINUED | OUTPATIENT
Start: 2021-03-12 | End: 2021-03-12

## 2021-03-11 RX ORDER — PANTOPRAZOLE SODIUM 20 MG/1
40 TABLET, DELAYED RELEASE ORAL ONCE
Refills: 0 | Status: COMPLETED | OUTPATIENT
Start: 2021-03-11 | End: 2021-03-11

## 2021-03-11 RX ORDER — DEXAMETHASONE 0.5 MG/5ML
10 ELIXIR ORAL EVERY 8 HOURS
Refills: 0 | Status: DISCONTINUED | OUTPATIENT
Start: 2021-03-12 | End: 2021-03-15

## 2021-03-11 RX ORDER — SENNA PLUS 8.6 MG/1
2 TABLET ORAL AT BEDTIME
Refills: 0 | Status: DISCONTINUED | OUTPATIENT
Start: 2021-03-11 | End: 2021-03-17

## 2021-03-11 RX ORDER — CEFAZOLIN SODIUM 1 G
2000 VIAL (EA) INJECTION EVERY 8 HOURS
Refills: 0 | Status: COMPLETED | OUTPATIENT
Start: 2021-03-11 | End: 2021-03-12

## 2021-03-11 RX ORDER — ONDANSETRON 8 MG/1
4 TABLET, FILM COATED ORAL EVERY 6 HOURS
Refills: 0 | Status: DISCONTINUED | OUTPATIENT
Start: 2021-03-11 | End: 2021-03-17

## 2021-03-11 RX ORDER — TRAMADOL HYDROCHLORIDE 50 MG/1
50 TABLET ORAL EVERY 8 HOURS
Refills: 0 | Status: DISCONTINUED | OUTPATIENT
Start: 2021-03-11 | End: 2021-03-17

## 2021-03-11 RX ORDER — ACETAMINOPHEN 500 MG
975 TABLET ORAL EVERY 8 HOURS
Refills: 0 | Status: DISCONTINUED | OUTPATIENT
Start: 2021-03-12 | End: 2021-03-17

## 2021-03-11 RX ORDER — ASPIRIN/CALCIUM CARB/MAGNESIUM 324 MG
81 TABLET ORAL
Refills: 0 | Status: DISCONTINUED | OUTPATIENT
Start: 2021-03-11 | End: 2021-03-17

## 2021-03-11 RX ORDER — ASCORBIC ACID 60 MG
500 TABLET,CHEWABLE ORAL
Refills: 0 | Status: DISCONTINUED | OUTPATIENT
Start: 2021-03-11 | End: 2021-03-17

## 2021-03-11 RX ORDER — TRAMADOL HYDROCHLORIDE 50 MG/1
50 TABLET ORAL ONCE
Refills: 0 | Status: DISCONTINUED | OUTPATIENT
Start: 2021-03-11 | End: 2021-03-11

## 2021-03-11 RX ORDER — POLYETHYLENE GLYCOL 3350 17 G/17G
17 POWDER, FOR SOLUTION ORAL AT BEDTIME
Refills: 0 | Status: DISCONTINUED | OUTPATIENT
Start: 2021-03-11 | End: 2021-03-17

## 2021-03-11 RX ORDER — ACETAMINOPHEN 500 MG
1000 TABLET ORAL ONCE
Refills: 0 | Status: DISCONTINUED | OUTPATIENT
Start: 2021-03-11 | End: 2021-03-17

## 2021-03-11 RX ORDER — ACETAMINOPHEN 500 MG
975 TABLET ORAL ONCE
Refills: 0 | Status: COMPLETED | OUTPATIENT
Start: 2021-03-11 | End: 2021-03-11

## 2021-03-11 RX ORDER — OXYCODONE HYDROCHLORIDE 5 MG/1
5 TABLET ORAL
Refills: 0 | Status: DISCONTINUED | OUTPATIENT
Start: 2021-03-11 | End: 2021-03-17

## 2021-03-11 RX ORDER — HYDROMORPHONE HYDROCHLORIDE 2 MG/ML
0.5 INJECTION INTRAMUSCULAR; INTRAVENOUS; SUBCUTANEOUS
Refills: 0 | Status: DISCONTINUED | OUTPATIENT
Start: 2021-03-11 | End: 2021-03-11

## 2021-03-11 RX ORDER — ONDANSETRON 8 MG/1
4 TABLET, FILM COATED ORAL ONCE
Refills: 0 | Status: DISCONTINUED | OUTPATIENT
Start: 2021-03-11 | End: 2021-03-11

## 2021-03-11 RX ORDER — FOLIC ACID 0.8 MG
1 TABLET ORAL DAILY
Refills: 0 | Status: DISCONTINUED | OUTPATIENT
Start: 2021-03-11 | End: 2021-03-17

## 2021-03-11 RX ORDER — GABAPENTIN 400 MG/1
100 CAPSULE ORAL THREE TIMES A DAY
Refills: 0 | Status: DISCONTINUED | OUTPATIENT
Start: 2021-03-11 | End: 2021-03-17

## 2021-03-11 RX ORDER — GABAPENTIN 400 MG/1
100 CAPSULE ORAL ONCE
Refills: 0 | Status: COMPLETED | OUTPATIENT
Start: 2021-03-11 | End: 2021-03-11

## 2021-03-11 RX ORDER — DEXAMETHASONE 0.5 MG/5ML
10 ELIXIR ORAL ONCE
Refills: 0 | Status: COMPLETED | OUTPATIENT
Start: 2021-03-12 | End: 2021-03-12

## 2021-03-11 RX ADMIN — Medication 100 MILLIGRAM(S): at 21:49

## 2021-03-11 RX ADMIN — SODIUM CHLORIDE 500 MILLILITER(S): 9 INJECTION INTRAMUSCULAR; INTRAVENOUS; SUBCUTANEOUS at 19:49

## 2021-03-11 RX ADMIN — GABAPENTIN 100 MILLIGRAM(S): 400 CAPSULE ORAL at 10:19

## 2021-03-11 RX ADMIN — HYDROMORPHONE HYDROCHLORIDE 0.5 MILLIGRAM(S): 2 INJECTION INTRAMUSCULAR; INTRAVENOUS; SUBCUTANEOUS at 15:21

## 2021-03-11 RX ADMIN — Medication 975 MILLIGRAM(S): at 10:20

## 2021-03-11 RX ADMIN — OXYCODONE HYDROCHLORIDE 10 MILLIGRAM(S): 5 TABLET ORAL at 22:18

## 2021-03-11 RX ADMIN — TRAMADOL HYDROCHLORIDE 50 MILLIGRAM(S): 50 TABLET ORAL at 10:21

## 2021-03-11 RX ADMIN — OXYCODONE HYDROCHLORIDE 10 MILLIGRAM(S): 5 TABLET ORAL at 21:48

## 2021-03-11 RX ADMIN — Medication 1 TABLET(S): at 21:50

## 2021-03-11 RX ADMIN — POLYETHYLENE GLYCOL 3350 17 GRAM(S): 17 POWDER, FOR SOLUTION ORAL at 21:49

## 2021-03-11 RX ADMIN — HYDROMORPHONE HYDROCHLORIDE 0.5 MILLIGRAM(S): 2 INJECTION INTRAMUSCULAR; INTRAVENOUS; SUBCUTANEOUS at 16:46

## 2021-03-11 RX ADMIN — Medication 500 MILLIGRAM(S): at 21:50

## 2021-03-11 RX ADMIN — GABAPENTIN 100 MILLIGRAM(S): 400 CAPSULE ORAL at 21:49

## 2021-03-11 RX ADMIN — HYDROMORPHONE HYDROCHLORIDE 1 MILLIGRAM(S): 2 INJECTION INTRAMUSCULAR; INTRAVENOUS; SUBCUTANEOUS at 16:46

## 2021-03-11 RX ADMIN — SENNA PLUS 2 TABLET(S): 8.6 TABLET ORAL at 21:49

## 2021-03-11 RX ADMIN — PANTOPRAZOLE SODIUM 40 MILLIGRAM(S): 20 TABLET, DELAYED RELEASE ORAL at 10:20

## 2021-03-11 RX ADMIN — HYDROMORPHONE HYDROCHLORIDE 1 MILLIGRAM(S): 2 INJECTION INTRAMUSCULAR; INTRAVENOUS; SUBCUTANEOUS at 15:45

## 2021-03-11 RX ADMIN — SODIUM CHLORIDE 500 MILLILITER(S): 9 INJECTION INTRAMUSCULAR; INTRAVENOUS; SUBCUTANEOUS at 15:00

## 2021-03-11 NOTE — PROGRESS NOTE ADULT - SUBJECTIVE AND OBJECTIVE BOX
ORTHO POC      Patient resting comfortably without complaint s/p left TKA today     Vital Signs Last 24 Hrs  T(C): 36.8 (11 Mar 2021 16:00), Max: 36.8 (11 Mar 2021 16:00)  T(F): 98.2 (11 Mar 2021 16:00), Max: 98.2 (11 Mar 2021 16:00)  HR: 93 (11 Mar 2021 16:00) (75 - 93)  BP: 136/74 (11 Mar 2021 16:00) (136/74 - 156/82)  BP(mean): --  RR: 14 (11 Mar 2021 16:00) (12 - 16)  SpO2: 96% (11 Mar 2021 16:00) (95% - 99%)    left lower extremity : dressing clean/dry/ intact              LE:  EHL/GC/TA 5/5                  sensory intact                   DP pulse 2+    Labs :                      12.2   7.60  )-----------( 131      ( 11 Mar 2021 15:20 )             37.8                 03-11    138  |  103  |  15  ----------------------------<  160<H>  3.8   |  25  |  1.02    Ca    9.0      11 Mar 2021 15:20        U/O:  DTV    A/P: Stable postop            PT- WBAT            DVT prophylaxis- venodynes/ ASA BID           Pain Control            Incentive Spirometer            Antibiotics x 24 hr            Follow up AM labs

## 2021-03-12 ENCOUNTER — TRANSCRIPTION ENCOUNTER (OUTPATIENT)
Age: 71
End: 2021-03-12

## 2021-03-12 DIAGNOSIS — E78.5 HYPERLIPIDEMIA, UNSPECIFIED: ICD-10-CM

## 2021-03-12 DIAGNOSIS — D62 ACUTE POSTHEMORRHAGIC ANEMIA: ICD-10-CM

## 2021-03-12 DIAGNOSIS — M17.12 UNILATERAL PRIMARY OSTEOARTHRITIS, LEFT KNEE: ICD-10-CM

## 2021-03-12 DIAGNOSIS — I10 ESSENTIAL (PRIMARY) HYPERTENSION: ICD-10-CM

## 2021-03-12 DIAGNOSIS — Z29.9 ENCOUNTER FOR PROPHYLACTIC MEASURES, UNSPECIFIED: ICD-10-CM

## 2021-03-12 DIAGNOSIS — K21.9 GASTRO-ESOPHAGEAL REFLUX DISEASE WITHOUT ESOPHAGITIS: ICD-10-CM

## 2021-03-12 DIAGNOSIS — E87.1 HYPO-OSMOLALITY AND HYPONATREMIA: ICD-10-CM

## 2021-03-12 LAB
ANION GAP SERPL CALC-SCNC: 12 MMOL/L — SIGNIFICANT CHANGE UP (ref 7–14)
BUN SERPL-MCNC: 12 MG/DL — SIGNIFICANT CHANGE UP (ref 7–23)
CALCIUM SERPL-MCNC: 8.9 MG/DL — SIGNIFICANT CHANGE UP (ref 8.4–10.5)
CHLORIDE SERPL-SCNC: 98 MMOL/L — SIGNIFICANT CHANGE UP (ref 98–107)
CO2 SERPL-SCNC: 22 MMOL/L — SIGNIFICANT CHANGE UP (ref 22–31)
CREAT SERPL-MCNC: 0.93 MG/DL — SIGNIFICANT CHANGE UP (ref 0.5–1.3)
GLUCOSE SERPL-MCNC: 149 MG/DL — HIGH (ref 70–99)
HCT VFR BLD CALC: 32.2 % — LOW (ref 39–50)
HGB BLD-MCNC: 10.6 G/DL — LOW (ref 13–17)
MCHC RBC-ENTMCNC: 29.5 PG — SIGNIFICANT CHANGE UP (ref 27–34)
MCHC RBC-ENTMCNC: 32.9 GM/DL — SIGNIFICANT CHANGE UP (ref 32–36)
MCV RBC AUTO: 89.7 FL — SIGNIFICANT CHANGE UP (ref 80–100)
NRBC # BLD: 0 /100 WBCS — SIGNIFICANT CHANGE UP
NRBC # FLD: 0 K/UL — SIGNIFICANT CHANGE UP
PLATELET # BLD AUTO: 133 K/UL — LOW (ref 150–400)
POTASSIUM SERPL-MCNC: 4.1 MMOL/L — SIGNIFICANT CHANGE UP (ref 3.5–5.3)
POTASSIUM SERPL-SCNC: 4.1 MMOL/L — SIGNIFICANT CHANGE UP (ref 3.5–5.3)
RBC # BLD: 3.59 M/UL — LOW (ref 4.2–5.8)
RBC # FLD: 13.2 % — SIGNIFICANT CHANGE UP (ref 10.3–14.5)
SODIUM SERPL-SCNC: 132 MMOL/L — LOW (ref 135–145)
WBC # BLD: 8.52 K/UL — SIGNIFICANT CHANGE UP (ref 3.8–10.5)
WBC # FLD AUTO: 8.52 K/UL — SIGNIFICANT CHANGE UP (ref 3.8–10.5)

## 2021-03-12 PROCEDURE — 99223 1ST HOSP IP/OBS HIGH 75: CPT

## 2021-03-12 RX ORDER — ATORVASTATIN CALCIUM 80 MG/1
20 TABLET, FILM COATED ORAL AT BEDTIME
Refills: 0 | Status: DISCONTINUED | OUTPATIENT
Start: 2021-03-12 | End: 2021-03-17

## 2021-03-12 RX ORDER — LOSARTAN POTASSIUM 100 MG/1
50 TABLET, FILM COATED ORAL DAILY
Refills: 0 | Status: DISCONTINUED | OUTPATIENT
Start: 2021-03-12 | End: 2021-03-17

## 2021-03-12 RX ORDER — FAMOTIDINE 10 MG/ML
2 INJECTION INTRAVENOUS
Qty: 0 | Refills: 0 | DISCHARGE

## 2021-03-12 RX ORDER — ATORVASTATIN CALCIUM 80 MG/1
1 TABLET, FILM COATED ORAL
Qty: 0 | Refills: 0 | DISCHARGE

## 2021-03-12 RX ADMIN — OXYCODONE HYDROCHLORIDE 5 MILLIGRAM(S): 5 TABLET ORAL at 07:11

## 2021-03-12 RX ADMIN — Medication 975 MILLIGRAM(S): at 14:01

## 2021-03-12 RX ADMIN — OXYCODONE HYDROCHLORIDE 10 MILLIGRAM(S): 5 TABLET ORAL at 21:21

## 2021-03-12 RX ADMIN — GABAPENTIN 100 MILLIGRAM(S): 400 CAPSULE ORAL at 21:21

## 2021-03-12 RX ADMIN — ATORVASTATIN CALCIUM 20 MILLIGRAM(S): 80 TABLET, FILM COATED ORAL at 21:21

## 2021-03-12 RX ADMIN — Medication 100 MILLIGRAM(S): at 06:01

## 2021-03-12 RX ADMIN — Medication 81 MILLIGRAM(S): at 23:50

## 2021-03-12 RX ADMIN — Medication 102 MILLIGRAM(S): at 00:35

## 2021-03-12 RX ADMIN — GABAPENTIN 100 MILLIGRAM(S): 400 CAPSULE ORAL at 14:01

## 2021-03-12 RX ADMIN — Medication 102 MILLIGRAM(S): at 12:28

## 2021-03-12 RX ADMIN — Medication 975 MILLIGRAM(S): at 06:02

## 2021-03-12 RX ADMIN — Medication 81 MILLIGRAM(S): at 00:35

## 2021-03-12 RX ADMIN — SENNA PLUS 2 TABLET(S): 8.6 TABLET ORAL at 21:21

## 2021-03-12 RX ADMIN — Medication 1 TABLET(S): at 06:02

## 2021-03-12 RX ADMIN — Medication 975 MILLIGRAM(S): at 21:21

## 2021-03-12 RX ADMIN — Medication 500 MILLIGRAM(S): at 18:53

## 2021-03-12 RX ADMIN — Medication 102 MILLIGRAM(S): at 18:53

## 2021-03-12 RX ADMIN — Medication 500 MILLIGRAM(S): at 07:19

## 2021-03-12 RX ADMIN — Medication 1 TABLET(S): at 21:20

## 2021-03-12 RX ADMIN — Medication 15 MILLIGRAM(S): at 12:27

## 2021-03-12 RX ADMIN — OXYCODONE HYDROCHLORIDE 5 MILLIGRAM(S): 5 TABLET ORAL at 06:06

## 2021-03-12 RX ADMIN — OXYCODONE HYDROCHLORIDE 10 MILLIGRAM(S): 5 TABLET ORAL at 16:00

## 2021-03-12 RX ADMIN — Medication 1 MILLIGRAM(S): at 12:27

## 2021-03-12 RX ADMIN — OXYCODONE HYDROCHLORIDE 10 MILLIGRAM(S): 5 TABLET ORAL at 22:11

## 2021-03-12 RX ADMIN — PANTOPRAZOLE SODIUM 40 MILLIGRAM(S): 20 TABLET, DELAYED RELEASE ORAL at 06:06

## 2021-03-12 RX ADMIN — OXYCODONE HYDROCHLORIDE 10 MILLIGRAM(S): 5 TABLET ORAL at 14:59

## 2021-03-12 RX ADMIN — GABAPENTIN 100 MILLIGRAM(S): 400 CAPSULE ORAL at 06:06

## 2021-03-12 RX ADMIN — Medication 81 MILLIGRAM(S): at 12:27

## 2021-03-12 RX ADMIN — Medication 325 MILLIGRAM(S): at 12:28

## 2021-03-12 RX ADMIN — Medication 15 MILLIGRAM(S): at 18:54

## 2021-03-12 RX ADMIN — Medication 1 TABLET(S): at 14:01

## 2021-03-12 RX ADMIN — Medication 15 MILLIGRAM(S): at 02:07

## 2021-03-12 NOTE — DISCHARGE NOTE NURSING/CASE MANAGEMENT/SOCIAL WORK - PATIENT PORTAL LINK FT
You can access the FollowMyHealth Patient Portal offered by [facility] by registering at the following website: [email or web address]. By joining [employer]’s FollowMyHealth portal, you will also be able to view your health information using other applications (apps) compatible with our system.

## 2021-03-12 NOTE — CONSULT NOTE ADULT - ASSESSMENT
70M h/o glaucoma, OA of knee, hyperlipidemia, GERD, HTN s/p right total knee replacement (12/2020) and s/p right inguinal hernia repair (2014) presents for scheduled left total knee replacement on 3/11/21.

## 2021-03-12 NOTE — DISCHARGE NOTE PROVIDER - HOSPITAL COURSE
Pt is a 71 y/o male history of left knee OA s/p  elective LEFT TOTAL KNEE ARTHOPLASTY on 3/11/2021 with Dr. Pitts without any intraoperative complications.  Pt is doing well and stable for discharge.  Pt is tolerating physical therapy: WBAT with walker if needed, gait training.  Leave dressing on until post op office visit (POD#14). Have sutures/staples removed POD#14 if present.  The patient had no post operative complications and clinically progressed faster than anticipated. The following medical conditions were active treated during the hospital stay: HTN, HLD.  Patient was safely and appropriately discharged home. Pt is on enteric coated ASA 81mg PO BID for DVT prophylaxis, take for 6 weeks unless otherwise instructed by surgeon.  Follow up with Dr. Pitts  in two weeks. Follow up with primary care doctor in 1-2 weeks for continuity of care. Pt is a 71 y/o male history of left knee OA s/p  elective LEFT TOTAL KNEE ARTHOPLASTY on 3/11/2021 with Dr. Pitts without any intraoperative complications.  Pt is doing well and stable for discharge.  Pt is tolerating physical therapy: WBAT with walker if needed, gait training.  Leave dressing on until post op office visit (POD#14). Have sutures/staples removed POD#14 if present.  The patient had no post operative complications. The following medical conditions were active treated during the hospital stay: HTN, HLD.  Patient was safely and appropriately discharged. Pt is on enteric coated ASA 81mg PO BID for DVT prophylaxis, take for 6 weeks unless otherwise instructed by surgeon.  Follow up with Dr. Pitts  in two weeks. Follow up with primary care doctor in 1-2 weeks for continuity of care. 70year old male is admitted for elective Left total knee arthroplasty 3/11/2021 without any intraoperative complications.  Patient is doing well and stable for discharge.  Patient is tolerating physical therapy: weight bearing to Left leg, gait training, exercises as shown by Physical Therapy.  Keep wound dressing on as is until day of office visit.  Staples/sutures if applicable, to be removed in the office 14 days from date of surgery.  Patient is on Aspirin (MUST TAKE WITH FOOD) for anticoagulation.  Orthopaedic Surgeon Dr. Pitts would like patient to call private MD/Internist for appointment postop to maintain continuity of care.  Follow up with Dr. Pitts's office in 1-2 weeks.     Istop reviewed

## 2021-03-12 NOTE — DISCHARGE NOTE PROVIDER - NSDCCPCAREPLAN_GEN_ALL_CORE_FT
PRINCIPAL DISCHARGE DIAGNOSIS  Diagnosis: Primary osteoarthritis of left knee  Assessment and Plan of Treatment: Pt is a 69 y/o male history of left knee OA s/p  elective LEFT TOTAL KNEE ARTHOPLASTY on 3/11/2021 with Dr. Pitts without any intraoperative complications.  Pt is doing well and stable for discharge.  Pt is tolerating physical therapy: WBAT with walker if needed, gait training.  Leave dressing on until post op office visit (POD#14). Have sutures/staples removed POD#14 if present.  The patient had no post operative complications and clinically progressed faster than anticipated. The following medical conditions were active treated during the hospital stay: HTN, HLD.  Patient was safely and appropriately discharged home. Pt is on enteric coated ASA 81mg PO BID for DVT prophylaxis, take for 6 weeks unless otherwise instructed by surgeon.  Follow up with Dr. Pitts  in two weeks. Follow up with primary care doctor in 1-2 weeks for continuity of care.       PRINCIPAL DISCHARGE DIAGNOSIS  Diagnosis: Primary osteoarthritis of left knee  Assessment and Plan of Treatment: Pt is a 69 y/o male history of left knee OA s/p  elective LEFT TOTAL KNEE ARTHOPLASTY on 3/11/2021 with Dr. Pitts without any intraoperative complications.  Pt is doing well and stable for discharge.  Pt is tolerating physical therapy: WBAT with walker if needed, gait training.  Leave dressing on until post op office visit (POD#14). Have sutures/staples removed POD#14 if present.  The patient had no post operative complications. The following medical conditions were active treated during the hospital stay: HTN, HLD.  Patient was safely and appropriately discharged. Pt is on enteric coated ASA 81mg PO BID for DVT prophylaxis, take for 6 weeks unless otherwise instructed by surgeon.  Follow up with Dr. Pitts  in two weeks. Follow up with primary care doctor in 1-2 weeks for continuity of care.       PRINCIPAL DISCHARGE DIAGNOSIS  Diagnosis: Primary osteoarthritis of left knee  Assessment and Plan of Treatment: 70year old male is admitted for elective Left total knee arthroplasty 3/11/2021 without any intraoperative complications.  Patient is doing well and stable for discharge.  Patient is tolerating physical therapy: weight bearing to Left leg, gait training, exercises as shown by Physical Therapy.  Keep wound dressing on as is until day of office visit.  Staples/sutures if applicable, to be removed in the office 14 days from date of surgery.  Patient is on Aspirin (MUST TAKE WITH FOOD) for anticoagulation.  Orthopaedic Surgeon Dr. Pitts would like patient to call private MD/Internist for appointment postop to maintain continuity of care.  Follow up with Dr. Pitts's office in 1-2 weeks.   Istop reviewed

## 2021-03-12 NOTE — OCCUPATIONAL THERAPY INITIAL EVALUATION ADULT - PERTINENT HX OF CURRENT PROBLEM, REHAB EVAL
Pt is a 70 year old male with dx of osteoarthritis of left knee. Pt is now s/p Left Total knee replacement on 3/11/21.

## 2021-03-12 NOTE — DISCHARGE NOTE NURSING/CASE MANAGEMENT/SOCIAL WORK - NSDCPECAREGIVERED_GEN_ALL_CORE
knee replacement  exercise worksheet knee replacement  exercise worksheet  Force pamphlet,   managing pain at home handout knee replacement  exercise worksheet  Force pamphlet,   managing pain at home handout/Yes

## 2021-03-12 NOTE — PROGRESS NOTE ADULT - SUBJECTIVE AND OBJECTIVE BOX
ANESTHESIA POSTOP CHECK    70y Male POSTOP DAY 1 s/p L TKA under general anesthesia.    No COMPLAINTS    NO APPARENT ANESTHESIA COMPLICATIONS

## 2021-03-12 NOTE — DISCHARGE NOTE NURSING/CASE MANAGEMENT/SOCIAL WORK - NSDPDISTO_GEN_ALL_CORE
Pt. is afebrile and offers no complaints. In no acute distress. Left knee aquacel dressing: clean, dry and intact. Pt is ambulating with a walker, tolerating diet well, and voiding in adequate amounts./Home with home care Pt. is afebrile and offers no complaints. In no acute distress. Left knee aquacel dressing: clean, dry and intact. Pt is ambulating with a walker, tolerating diet well, and voiding in adequate amounts./Sub-Acute rehab

## 2021-03-12 NOTE — DISCHARGE NOTE PROVIDER - NSDCFUSCHEDAPPT_GEN_ALL_CORE_FT
RIZWAN BULL ; 03/16/2021 ; [facility]  RIZWAN BULL ; 03/26/2021 ; [facility] RIZWAN BULL ; 03/26/2021 ; [facility]

## 2021-03-12 NOTE — DISCHARGE NOTE PROVIDER - CARE PROVIDER_API CALL
Braydon Pitts)  Orthopaedic Surgery  [address] [place] [address], [facility] [address]  [place] [address]  Phone: [phone]  Fax: [phone]  Follow Up Time: 2 weeks

## 2021-03-12 NOTE — OCCUPATIONAL THERAPY INITIAL EVALUATION ADULT - DIAGNOSIS, OT EVAL
s/p Left Total knee replacement; Decreased standing balance; Decreased functional mobility; Decreased ADL management

## 2021-03-12 NOTE — DISCHARGE NOTE PROVIDER - NSDCCPTREATMENT_GEN_ALL_CORE_FT
PRINCIPAL PROCEDURE  Procedure: Total knee replacement  Findings and Treatment: See dictated operative note       PRINCIPAL PROCEDURE  Procedure: Total knee replacement  Findings and Treatment: See dictated operative note  weight bearing as tolerated to Left leg  call Surgeon's office to make appointment for 1-2 weeks from date of surgery Dr. Pitts [phone]

## 2021-03-12 NOTE — DISCHARGE NOTE NURSING/CASE MANAGEMENT/SOCIAL WORK - NSDCFUADDAPPT_GEN_ALL_CORE_FT
You have a post op appointment with Dr. Pitts on March 26, 2021 @ 12:15pm.  Follow-up with  in the office as instructed for post-op check as well as with PMD for continuity of care.

## 2021-03-12 NOTE — OCCUPATIONAL THERAPY INITIAL EVALUATION ADULT - LIVES WITH, PROFILE
Pt. reports he lives with his wife in an apartment within a house with no steps to enter. Once inside, pt. reports there are 8 steps to reach basement where apartment is located. Per pt., he has a bathtub in his bathroom.

## 2021-03-12 NOTE — DISCHARGE NOTE NURSING/CASE MANAGEMENT/SOCIAL WORK - NSDCPNINST_GEN_ALL_CORE
You have a post op appointment with Dr. Pitts on March 26, 2021 @ 12:15pm. If you are unable to keep this appointment, please call the office to reschedule. Call MD if you develop a fever, or if there is redness, swelling, drainage or pain not relieved by pain medication. No heavy lifting, bending, or straining to move your bowels. Take over the counter stool softeners as needed to prevent constipation which may be caused by pain medication.

## 2021-03-12 NOTE — DISCHARGE NOTE PROVIDER - NSDCMRMEDTOKEN_GEN_ALL_CORE_FT
atorvastatin 20 mg oral tablet: 1 tab(s) orally once a day  famotidine 20 mg oral tablet: 2 tab(s) orally once a day  gabapentin 100 mg oral tablet: 1 tab(s) orally 3 times a day  hydroCHLOROthiazide 12.5 mg oral tablet: 1 tab(s) orally once a day  losartan 50 mg oral tablet: 1 tab(s) orally once a day  metoclopramide 10 mg oral tablet: 1 tab(s) orally 3 times a day  sennosides 8.6m tab(s) orally once a day  Vitamin D3 1000 intl units (25 mcg) oral tablet: 1 tab(s) orally once a day   acetaminophen 325 mg oral tablet: 3 tab(s) orally every 8 hours take around the clock for one week then only as needed for mild pain  ascorbic acid 500 mg oral tablet: 1 tab(s) orally 2 times a day  aspirin 81 mg oral tablet, chewable: 1 tab(s) orally 2 times a day  atorvastatin 20 mg oral tablet: 1 tab(s) orally once a day  calcium-vitamin D 500 mg-200 intl units (5 mcg) oral tablet: 1 tab(s) orally 3 times a day  Colace 100 mg oral capsule: 1 cap(s) orally 2 times a day  famotidine 20 mg oral tablet: 2 tab(s) orally once a day  ferrous sulfate 325 mg (65 mg elemental iron) oral tablet: 1 tab(s) orally once a day  folic acid 1 mg oral tablet: 1 tab(s) orally once a day  gabapentin 100 mg oral capsule: 1 cap(s) orally 3 times a day  hydroCHLOROthiazide 12.5 mg oral capsule: 1 cap(s) orally once a day  losartan 50 mg oral tablet: 1 tab(s) orally once a day  Multiple Vitamins oral tablet: 1 tab(s) orally once a day  oxyCODONE 10 mg oral tablet: 1 tab(s) orally every 3 hours, As needed, Severe Pain (7 - 10)  oxyCODONE 5 mg oral tablet: 1 tab(s) orally every 3 hours, As needed, Moderate Pain (4 - 6)  senna oral tablet: 2 tab(s) orally once a day (at bedtime)  traMADol 50 mg oral tablet: 1 tab(s) orally every 8 hours, As needed, Mild Pain (1 - 3)   acetaminophen 325 mg oral tablet: 3 tab(s) orally every 8 hours take around the clock for one week then only as needed for mild pain  aspirin 81 mg oral tablet, chewable: 1 tab(s) orally 2 times a day  atorvastatin 20 mg oral tablet: 1 tab(s) orally once a day  calcium-vitamin D 500 mg-200 intl units (5 mcg) oral tablet: 1 tab(s) orally 3 times a day  Colace 100 mg oral capsule: 1 cap(s) orally 2 times a day  famotidine 20 mg oral tablet: 2 tab(s) orally once a day  gabapentin 100 mg oral capsule: 1 cap(s) orally 3 times a day  hydroCHLOROthiazide 12.5 mg oral capsule: 1 cap(s) orally once a day  losartan 50 mg oral tablet: 1 tab(s) orally once a day  senna oral tablet: 2 tab(s) orally once a day (at bedtime)   acetaminophen 325 mg oral tablet: 3 tab(s) orally every 8 hours take around the clock for one week then only as needed for mild pain  ascorbic acid 500 mg oral tablet: 1 tab(s) orally once a day  aspirin 81 mg oral tablet, chewable: 1 tab(s) orally 2 times a day  atorvastatin 20 mg oral tablet: 1 tab(s) orally once a day  calcium-vitamin D 500 mg-200 intl units (5 mcg) oral tablet: 1 tab(s) orally 3 times a day  Colace 100 mg oral capsule: 1 cap(s) orally 2 times a day  famotidine 20 mg oral tablet: 2 tab(s) orally once a day  gabapentin 100 mg oral capsule: 1 cap(s) orally 3 times a day  hydroCHLOROthiazide 12.5 mg oral capsule: 1 cap(s) orally once a day  losartan 50 mg oral tablet: 1 tab(s) orally once a day  Multiple Vitamins oral tablet: 1 tab(s) orally once a day  oxyCODONE 10 mg oral tablet: 1 tab(s) orally every 3 hours, As needed, Severe Pain (7 - 10)  oxyCODONE 5 mg oral tablet: 1 tab(s) orally every 3 hours, As needed, Mild or Moderate pain  pantoprazole 40 mg oral delayed release tablet: 1 tab(s) orally once a day (before a meal)  senna oral tablet: 2 tab(s) orally once a day (at bedtime)  traMADol 50 mg oral tablet: 1 tab(s) orally every 8 hours for 7 days

## 2021-03-12 NOTE — OCCUPATIONAL THERAPY INITIAL EVALUATION ADULT - GENERAL OBSERVATIONS, REHAB EVAL
Pt. received semisupine in bed. No acute distress. Patient agreed to evaluation from Occupational Therapist. +Ace Wrap to Left Knee, +IV. Pt.'s primary language is Itzel.  phone utilized (: Rasta #[id number])

## 2021-03-12 NOTE — CONSULT NOTE ADULT - PROBLEM SELECTOR RECOMMENDATION 9
-Pain well controlled; continue management and pain control per ortho recs with oxycodone IR, toradol q6hrs, oxycodone IR prn and tramadol prn  -c/w bowel regimen  -c/w incentive spirometer use  -c/w PT

## 2021-03-12 NOTE — PROGRESS NOTE ADULT - SUBJECTIVE AND OBJECTIVE BOX
Patient seen and examined. Pain controlled. No acute events overnight.       MEDICATIONS  (STANDING):  acetaminophen   Tablet .. 975 milliGRAM(s) Oral every 8 hours  acetaminophen  IVPB .. 1000 milliGRAM(s) IV Intermittent once  ascorbic acid 500 milliGRAM(s) Oral two times a day  aspirin  chewable 81 milliGRAM(s) Oral two times a day  calcium carbonate 1250 mG  + Vitamin D (OsCal 500 + D) 1 Tablet(s) Oral three times a day  dexAMETHasone  IVPB 10 milliGRAM(s) IV Intermittent every 8 hours  ferrous    sulfate 325 milliGRAM(s) Oral daily  folic acid 1 milliGRAM(s) Oral daily  gabapentin 100 milliGRAM(s) Oral three times a day  influenza  Vaccine (HIGH DOSE) 0.7 milliLiter(s) IntraMuscular once  ketorolac   Injectable 15 milliGRAM(s) IV Push every 6 hours  lactated ringers. 1000 milliLiter(s) IV Continuous <Continuous>  multivitamin 1 Tablet(s) Oral daily  pantoprazole    Tablet 40 milliGRAM(s) Oral before breakfast  polyethylene glycol 3350 17 Gram(s) Oral at bedtime  senna 2 Tablet(s) Oral at bedtime    Allergies    No Known Allergies    Intolerances                            12.2   7.60  )-----------( 131      ( 11 Mar 2021 15:20 )             37.8     11 Mar 2021 15:20    138    |  103    |  15     ----------------------------<  160    3.8     |  25     |  1.02     Ca    9.0        11 Mar 2021 15:20        Vital Signs Last 24 Hrs  T(C): 36.4 (03-12-21 @ 06:12), Max: 36.9 (03-11-21 @ 18:00)  T(F): 97.6 (03-12-21 @ 06:12), Max: 98.4 (03-11-21 @ 18:00)  HR: 98 (03-12-21 @ 06:12) (75 - 98)  BP: 141/85 (03-12-21 @ 06:12) (130/85 - 156/82)  BP(mean): --  RR: 18 (03-12-21 @ 06:12) (12 - 18)  SpO2: 95% (03-12-21 @ 06:12) (95% - 99%)    Physical Exam  Gen: NAD  LLE:   Dressing c/d/i  +ehl/fhl/ta/gs function  L2-S1 silt  Dp/pt pulse intact  No calf ttp  Compartments soft  RLE: mild effusion right now, no pain with active and passive ROM    A/P: 70y Male sp L TKA POD1  Pain control  DVT ppx asa  PT/WBAT/OOB  FU labs  Ice/elevate  Medical management appreciated  Incentive spirometry  Dispo planning

## 2021-03-12 NOTE — OCCUPATIONAL THERAPY INITIAL EVALUATION ADULT - MD ORDER
Occupational Therapy (OT) to evaluate and treat. Occupational Therapy (OT) to evaluate and treat. Out of Bed to Chair. Per GALI Carrillo, pt is okay to participate in OT evaluation and perform activity as tolerated.

## 2021-03-12 NOTE — CONSULT NOTE ADULT - SUBJECTIVE AND OBJECTIVE BOX
CHIEF COMPLAINT: Patient is a 70y old  Male who presents with a chief complaint of L TKA (12 Mar 2021 09:49)    HPI: 70M h/o glaucoma, OA of knee, hyperlipidemia, GERD, HTN s/p right total knee replacement (12/2020) and s/p right inguinal hernia repair (2014) presents for scheduled left total knee replacement on 3/11/21.    Patient tolerated procedure well. Denies any F/C/N/V, CP or SOB.    Pain Symptoms if applicable:             	                         none	   mild         moderate         severe  	                            0	    1-3	     4-6	         7-10  Pain: 6  Location: left knee	  Modifying factors: pain with walking 	  Associated symptoms: pain in right knee     Allergies: No Known Allergies    HOME MEDICATIONS: [x] Reviewed    MEDICATIONS  (STANDING):  acetaminophen   Tablet .. 975 milliGRAM(s) Oral every 8 hours  acetaminophen  IVPB .. 1000 milliGRAM(s) IV Intermittent once  ascorbic acid 500 milliGRAM(s) Oral two times a day  aspirin  chewable 81 milliGRAM(s) Oral two times a day  calcium carbonate 1250 mG  + Vitamin D (OsCal 500 + D) 1 Tablet(s) Oral three times a day  dexAMETHasone  IVPB 10 milliGRAM(s) IV Intermittent every 8 hours  ferrous    sulfate 325 milliGRAM(s) Oral daily  folic acid 1 milliGRAM(s) Oral daily  gabapentin 100 milliGRAM(s) Oral three times a day  influenza  Vaccine (HIGH DOSE) 0.7 milliLiter(s) IntraMuscular once  ketorolac   Injectable 15 milliGRAM(s) IV Push every 6 hours  lactated ringers. 1000 milliLiter(s) (75 mL/Hr) IV Continuous <Continuous>  multivitamin 1 Tablet(s) Oral daily  pantoprazole    Tablet 40 milliGRAM(s) Oral before breakfast  polyethylene glycol 3350 17 Gram(s) Oral at bedtime  senna 2 Tablet(s) Oral at bedtime    MEDICATIONS  (PRN):  ondansetron Injectable 4 milliGRAM(s) IV Push every 6 hours PRN Nausea and/or Vomiting  oxyCODONE    IR 5 milliGRAM(s) Oral every 3 hours PRN Moderate Pain (4 - 6)  oxyCODONE    IR 10 milliGRAM(s) Oral every 3 hours PRN Severe Pain (7 - 10)  traMADol 50 milliGRAM(s) Oral every 8 hours PRN Mild Pain (1 - 3)    PAST MEDICAL & SURGICAL HISTORY:  H/O: glaucoma  OA (osteoarthritis) of knee  Hyperlipidemia  GERD (gastroesophageal reflux disease)  Hypertension  S/P total knee replacement, right 12/2020  S/P right inguinal hernia repair 2014 [ approximate]  [x ] Reviewed     SOCIAL HISTORY:  · Marital Status	  · Occupation	"I don't do anything"  · Lives With	spouse; son    Substance Use History:  · Substance Use	caffeine  · Caffeine Type	coffee; tea  · Caffeine Amount/Frequency	1-2 cups/cans per day    Alcohol Use History:  · Have you ever consumed alcohol	yes...  · Alcohol Type	beer  · Alcohol Frequency	occasional  · Alcohol Amount	1-2 drinks  · Problems Related to Alcohol Use	no  · 1. Have you felt you ought to CUT down on your drinking?	no  · 2. Have people ANNOYED you by criticizing your drinking?	no  · 3. Have you ever felt bad or GUILTY about your drinking?	no  · 4. Have you ever needed an "EYE OPENER", a drink first thing in the morning to steady your nerves or get rid of a hangover?	no    Tobacco Usage:  · Tobacco Usage: Never smoker    FAMILY HISTORY:  No pertinent family history in first degree relatives  [x] No pertinent family history in first degree relatives     REVIEW OF SYSTEMS:  [x] All other ROS negative  [  ] Unable to obtain due to poor mental status    Vital Signs Last 24 Hrs  T(C): 36.8 (12 Mar 2021 09:23), Max: 36.9 (11 Mar 2021 18:00)  T(F): 98.2 (12 Mar 2021 09:23), Max: 98.4 (11 Mar 2021 18:00)  HR: 97 (12 Mar 2021 09:23) (82 - 98)  BP: 133/68 (12 Mar 2021 09:23) (130/85 - 156/82)  BP(mean): --  RR: 17 (12 Mar 2021 09:23) (12 - 18)  SpO2: 97% (12 Mar 2021 09:23) (95% - 99%)    PHYSICAL EXAM:  GENERAL: NAD, well-groomed, well-developed  HEAD:  Atraumatic, Normocephalic  EYES: EOMI, PERRLA, conjunctiva and sclera clear  ENMT: Moist mucous membranes  NECK: Supple, No JVD  RESPIRATORY: Clear to auscultation bilaterally; No rales, rhonchi, wheezing, or rubs  CARDIOVASCULAR: Regular rate and rhythm; No murmurs, rubs, or gallops  GASTROINTESTINAL: Soft, Nontender, Nondistended; Bowel sounds present  GENITOURINARY: Not examined  EXTREMITIES:  2+ Peripheral Pulses, No clubbing, cyanosis, or edema  NERVOUS SYSTEM:  Alert & Oriented X3; Moving all 4 extremities; No gross sensory deficits  HEME/LYMPH: No lymphadenopathy noted  SKIN: No rashes or lesions; Incisions C/D/I    LABS:                        10.6   8.52  )-----------( 133      ( 12 Mar 2021 07:30 )             32.2     Hemoglobin: 10.6 g/dL (03-12 @ 07:30)  Hemoglobin: 12.2 g/dL (03-11 @ 15:20)    03-12    132<L>  |  98  |  12  ----------------------------<  149<H>  4.1   |  22  |  0.93    Ca    8.9      12 Mar 2021 07:30       RADIOLOGY & ADDITIONAL STUDIES:  Imaging:   Personally Reviewed:  [x] YES   < from: Xray Knee 1 or 2 Views, Left (03.11.21 @ 15:50) >  IMPRESSION:  Unconstrained left total knee prosthesis implanted.  Intact and aligned hardware and no periprosthetic fractures.  Postoperative soft tissue changes.  Correlate with intraoperative findings.  < end of copied text >    [ ] Consultant(s) Notes Reviewed  [x] Care Discussed with Consultants/Other Providers: Ortho PA - discussed disposition

## 2021-03-12 NOTE — DISCHARGE NOTE PROVIDER - NSDCCAREPROVSEEN_GEN_ALL_CORE_FT
Braydon Pitts Alar Island Pedicle Flap Text: The defect edges were debeveled with a #15 scalpel blade.  Given the location of the defect, shape of the defect and the proximity to the alar rim an island pedicle advancement flap was deemed most appropriate.  Using a sterile surgical marker, an appropriate advancement flap was drawn incorporating the defect, outlining the appropriate donor tissue and placing the expected incisions within the nasal ala running parallel to the alar rim. The area thus outlined was incised with a #15 scalpel blade.  The skin margins were undermined minimally to an appropriate distance in all directions around the primary defect and laterally outward around the island pedicle utilizing iris scissors.  There was minimal undermining beneath the pedicle flap.

## 2021-03-13 PROCEDURE — 99233 SBSQ HOSP IP/OBS HIGH 50: CPT

## 2021-03-13 PROCEDURE — 73560 X-RAY EXAM OF KNEE 1 OR 2: CPT | Mod: 26,RT

## 2021-03-13 RX ADMIN — Medication 102 MILLIGRAM(S): at 11:57

## 2021-03-13 RX ADMIN — Medication 325 MILLIGRAM(S): at 11:57

## 2021-03-13 RX ADMIN — GABAPENTIN 100 MILLIGRAM(S): 400 CAPSULE ORAL at 05:52

## 2021-03-13 RX ADMIN — Medication 1 TABLET(S): at 05:52

## 2021-03-13 RX ADMIN — GABAPENTIN 100 MILLIGRAM(S): 400 CAPSULE ORAL at 21:25

## 2021-03-13 RX ADMIN — Medication 81 MILLIGRAM(S): at 23:42

## 2021-03-13 RX ADMIN — Medication 1 MILLIGRAM(S): at 11:57

## 2021-03-13 RX ADMIN — GABAPENTIN 100 MILLIGRAM(S): 400 CAPSULE ORAL at 14:03

## 2021-03-13 RX ADMIN — Medication 1 TABLET(S): at 14:03

## 2021-03-13 RX ADMIN — ATORVASTATIN CALCIUM 20 MILLIGRAM(S): 80 TABLET, FILM COATED ORAL at 21:25

## 2021-03-13 RX ADMIN — Medication 102 MILLIGRAM(S): at 02:50

## 2021-03-13 RX ADMIN — POLYETHYLENE GLYCOL 3350 17 GRAM(S): 17 POWDER, FOR SOLUTION ORAL at 05:52

## 2021-03-13 RX ADMIN — POLYETHYLENE GLYCOL 3350 17 GRAM(S): 17 POWDER, FOR SOLUTION ORAL at 21:24

## 2021-03-13 RX ADMIN — LOSARTAN POTASSIUM 50 MILLIGRAM(S): 100 TABLET, FILM COATED ORAL at 05:54

## 2021-03-13 RX ADMIN — Medication 81 MILLIGRAM(S): at 11:57

## 2021-03-13 RX ADMIN — PANTOPRAZOLE SODIUM 40 MILLIGRAM(S): 20 TABLET, DELAYED RELEASE ORAL at 05:52

## 2021-03-13 RX ADMIN — Medication 1 TABLET(S): at 21:25

## 2021-03-13 RX ADMIN — Medication 1 TABLET(S): at 11:57

## 2021-03-13 RX ADMIN — Medication 975 MILLIGRAM(S): at 14:03

## 2021-03-13 RX ADMIN — SODIUM CHLORIDE 75 MILLILITER(S): 9 INJECTION, SOLUTION INTRAVENOUS at 21:25

## 2021-03-13 RX ADMIN — Medication 500 MILLIGRAM(S): at 18:44

## 2021-03-13 RX ADMIN — SENNA PLUS 2 TABLET(S): 8.6 TABLET ORAL at 21:25

## 2021-03-13 RX ADMIN — Medication 975 MILLIGRAM(S): at 05:52

## 2021-03-13 RX ADMIN — Medication 500 MILLIGRAM(S): at 05:52

## 2021-03-13 RX ADMIN — Medication 975 MILLIGRAM(S): at 21:25

## 2021-03-13 RX ADMIN — Medication 102 MILLIGRAM(S): at 18:45

## 2021-03-13 RX ADMIN — OXYCODONE HYDROCHLORIDE 10 MILLIGRAM(S): 5 TABLET ORAL at 06:28

## 2021-03-13 NOTE — PROGRESS NOTE ADULT - SUBJECTIVE AND OBJECTIVE BOX
Patient seen and examined. Pain controlled. No acute events overnight.  Has been OOB.    ICU Vital Signs Last 24 Hrs  T(C): 36.9 (13 Mar 2021 05:40), Max: 37.1 (13 Mar 2021 01:26)  T(F): 98.5 (13 Mar 2021 05:40), Max: 98.8 (13 Mar 2021 01:26)  HR: 91 (13 Mar 2021 05:40) (90 - 100)  BP: 166/78 (13 Mar 2021 05:40) (133/68 - 166/78)  BP(mean): --  ABP: --  ABP(mean): --  RR: 16 (13 Mar 2021 05:40) (16 - 18)  SpO2: 96% (13 Mar 2021 05:40) (96% - 98%)      Physical Exam  Gen: NAD  LLE:   Dressing c/d/i  +ehl/fhl/ta/gs function  L2-S1 silt  Dp/pt pulse intact  No calf ttp  Compartments soft    A/P: 70y Male sp L TKA, progressing well  Pain control  DVT ppx asa  PT/WBAT/OOB  FU labs  Ice/elevate  Medical management appreciated  Incentive spirometry  Dispo planning

## 2021-03-13 NOTE — PROGRESS NOTE ADULT - SUBJECTIVE AND OBJECTIVE BOX
CHIEF COMPLAINT: f/u left total knee    SUBJECTIVE / OVERNIGHT EVENTS: Patient seen and examined. No acute events overnight. Pain well controlled and patient without any complaints. Patient denies any chest pain or SOB.     MEDICATIONS  (STANDING):  acetaminophen   Tablet .. 975 milliGRAM(s) Oral every 8 hours  acetaminophen  IVPB .. 1000 milliGRAM(s) IV Intermittent once  ascorbic acid 500 milliGRAM(s) Oral two times a day  aspirin  chewable 81 milliGRAM(s) Oral two times a day  atorvastatin 20 milliGRAM(s) Oral at bedtime  calcium carbonate 1250 mG  + Vitamin D (OsCal 500 + D) 1 Tablet(s) Oral three times a day  dexAMETHasone  IVPB 10 milliGRAM(s) IV Intermittent every 8 hours  ferrous    sulfate 325 milliGRAM(s) Oral daily  folic acid 1 milliGRAM(s) Oral daily  gabapentin 100 milliGRAM(s) Oral three times a day  lactated ringers. 1000 milliLiter(s) (75 mL/Hr) IV Continuous <Continuous>  losartan 50 milliGRAM(s) Oral daily  multivitamin 1 Tablet(s) Oral daily  pantoprazole    Tablet 40 milliGRAM(s) Oral before breakfast  polyethylene glycol 3350 17 Gram(s) Oral at bedtime  senna 2 Tablet(s) Oral at bedtime    MEDICATIONS  (PRN):  ondansetron Injectable 4 milliGRAM(s) IV Push every 6 hours PRN Nausea and/or Vomiting  oxyCODONE    IR 5 milliGRAM(s) Oral every 3 hours PRN Moderate Pain (4 - 6)  oxyCODONE    IR 10 milliGRAM(s) Oral every 3 hours PRN Severe Pain (7 - 10)  traMADol 50 milliGRAM(s) Oral every 8 hours PRN Mild Pain (1 - 3)    VITALS:  T(F): 98.2 (03-13-21 @ 09:25), Max: 98.8 (03-13-21 @ 01:26)  HR: 90 (03-13-21 @ 09:25) (90 - 100)  BP: 151/90 (03-13-21 @ 09:25) (138/79 - 166/78)  RR: 18 (03-13-21 @ 09:25) (16 - 18)  SpO2: 99% (03-13-21 @ 09:25)    PHYSICAL EXAM:  GENERAL: NAD, well-developed  CHEST/LUNG: Clear to auscultation bilaterally; No wheeze  HEART: Regular rate and rhythm; No murmurs, rubs, or gallops  ABDOMEN: Soft, Nontender, Nondistended; Bowel sounds present  EXTREMITIES:  2+ Peripheral Pulses, No clubbing, cyanosis, or edema  SKIN: No rashes or lesions; incision C/D/I    LABS:              10.6                 132  | 22   | 12           8.52  >-----------< 133     ------------------------< 149                   32.2                 4.1  | 98   | 0.93                                         Ca 8.9   Mg x     Ph x        [ ] Consultant(s) Notes Reviewed:  [x] Care Discussed with Consultants/Other Providers: Orthopedic PA - discussed disposition CHIEF COMPLAINT: f/u left total knee    SUBJECTIVE / OVERNIGHT EVENTS: Patient seen and examined. No acute events overnight. Pain well controlled and patient without any complaints. Patient denies any chest pain or SOB.     MEDICATIONS  (STANDING):  acetaminophen   Tablet .. 975 milliGRAM(s) Oral every 8 hours  acetaminophen  IVPB .. 1000 milliGRAM(s) IV Intermittent once  ascorbic acid 500 milliGRAM(s) Oral two times a day  aspirin  chewable 81 milliGRAM(s) Oral two times a day  atorvastatin 20 milliGRAM(s) Oral at bedtime  calcium carbonate 1250 mG  + Vitamin D (OsCal 500 + D) 1 Tablet(s) Oral three times a day  dexAMETHasone  IVPB 10 milliGRAM(s) IV Intermittent every 8 hours  ferrous    sulfate 325 milliGRAM(s) Oral daily  folic acid 1 milliGRAM(s) Oral daily  gabapentin 100 milliGRAM(s) Oral three times a day  lactated ringers. 1000 milliLiter(s) (75 mL/Hr) IV Continuous <Continuous>  losartan 50 milliGRAM(s) Oral daily  multivitamin 1 Tablet(s) Oral daily  pantoprazole    Tablet 40 milliGRAM(s) Oral before breakfast  polyethylene glycol 3350 17 Gram(s) Oral at bedtime  senna 2 Tablet(s) Oral at bedtime    MEDICATIONS  (PRN):  ondansetron Injectable 4 milliGRAM(s) IV Push every 6 hours PRN Nausea and/or Vomiting  oxyCODONE    IR 5 milliGRAM(s) Oral every 3 hours PRN Moderate Pain (4 - 6)  oxyCODONE    IR 10 milliGRAM(s) Oral every 3 hours PRN Severe Pain (7 - 10)  traMADol 50 milliGRAM(s) Oral every 8 hours PRN Mild Pain (1 - 3)    VITALS:  T(F): 98.2 (03-13-21 @ 09:25), Max: 98.8 (03-13-21 @ 01:26)  HR: 90 (03-13-21 @ 09:25) (90 - 100)  BP: 151/90 (03-13-21 @ 09:25) (138/79 - 166/78)  RR: 18 (03-13-21 @ 09:25) (16 - 18)  SpO2: 99% (03-13-21 @ 09:25)    PHYSICAL EXAM:  GENERAL: NAD, well-developed  CHEST/LUNG: Clear to auscultation bilaterally; No wheeze  HEART: Regular rate and rhythm; No murmurs, rubs, or gallops  ABDOMEN: Soft, Nontender, Nondistended; Bowel sounds present  EXTREMITIES:  2+ Peripheral Pulses, No clubbing, cyanosis, or edema  SKIN: No rashes or lesions; incision C/D/I    LABS:              10.6                 132  | 22   | 12           8.52  >-----------< 133     ------------------------< 149                   32.2                 4.1  | 98   | 0.93                                         Ca 8.9   Mg x     Ph x        [ ] Consultant(s) Notes Reviewed:  [x] Care Discussed with Consultants/Other Providers: Orthopedic resident - discussed disposition CHIEF COMPLAINT: f/u left total knee    SUBJECTIVE / OVERNIGHT EVENTS: Patient seen and examined. No acute events overnight. Patient c/o right knee pain. Patient denies any chest pain or SOB.     MEDICATIONS  (STANDING):  acetaminophen   Tablet .. 975 milliGRAM(s) Oral every 8 hours  acetaminophen  IVPB .. 1000 milliGRAM(s) IV Intermittent once  ascorbic acid 500 milliGRAM(s) Oral two times a day  aspirin  chewable 81 milliGRAM(s) Oral two times a day  atorvastatin 20 milliGRAM(s) Oral at bedtime  calcium carbonate 1250 mG  + Vitamin D (OsCal 500 + D) 1 Tablet(s) Oral three times a day  dexAMETHasone  IVPB 10 milliGRAM(s) IV Intermittent every 8 hours  ferrous    sulfate 325 milliGRAM(s) Oral daily  folic acid 1 milliGRAM(s) Oral daily  gabapentin 100 milliGRAM(s) Oral three times a day  lactated ringers. 1000 milliLiter(s) (75 mL/Hr) IV Continuous <Continuous>  losartan 50 milliGRAM(s) Oral daily  multivitamin 1 Tablet(s) Oral daily  pantoprazole    Tablet 40 milliGRAM(s) Oral before breakfast  polyethylene glycol 3350 17 Gram(s) Oral at bedtime  senna 2 Tablet(s) Oral at bedtime    MEDICATIONS  (PRN):  ondansetron Injectable 4 milliGRAM(s) IV Push every 6 hours PRN Nausea and/or Vomiting  oxyCODONE    IR 5 milliGRAM(s) Oral every 3 hours PRN Moderate Pain (4 - 6)  oxyCODONE    IR 10 milliGRAM(s) Oral every 3 hours PRN Severe Pain (7 - 10)  traMADol 50 milliGRAM(s) Oral every 8 hours PRN Mild Pain (1 - 3)    VITALS:  T(F): 98.2 (03-13-21 @ 09:25), Max: 98.8 (03-13-21 @ 01:26)  HR: 90 (03-13-21 @ 09:25) (90 - 100)  BP: 151/90 (03-13-21 @ 09:25) (138/79 - 166/78)  RR: 18 (03-13-21 @ 09:25) (16 - 18)  SpO2: 99% (03-13-21 @ 09:25)    PHYSICAL EXAM:  GENERAL: NAD, well-developed  CHEST/LUNG: Clear to auscultation bilaterally; No wheeze  HEART: Regular rate and rhythm; No murmurs, rubs, or gallops  ABDOMEN: Soft, Nontender, Nondistended; Bowel sounds present  EXTREMITIES:  2+ Peripheral Pulses, No clubbing, cyanosis, or edema  SKIN: No rashes or lesions; incision C/D/I    LABS:              10.6                 132  | 22   | 12           8.52  >-----------< 133     ------------------------< 149                   32.2                 4.1  | 98   | 0.93                                         Ca 8.9   Mg x     Ph x        [ ] Consultant(s) Notes Reviewed:  [x] Care Discussed with Consultants/Other Providers: Orthopedic resident - discussed disposition

## 2021-03-14 PROCEDURE — 99233 SBSQ HOSP IP/OBS HIGH 50: CPT

## 2021-03-14 RX ORDER — HYDROCHLOROTHIAZIDE 25 MG
12.5 TABLET ORAL ONCE
Refills: 0 | Status: COMPLETED | OUTPATIENT
Start: 2021-03-14 | End: 2021-03-14

## 2021-03-14 RX ADMIN — Medication 102 MILLIGRAM(S): at 19:37

## 2021-03-14 RX ADMIN — Medication 500 MILLIGRAM(S): at 19:37

## 2021-03-14 RX ADMIN — Medication 102 MILLIGRAM(S): at 04:23

## 2021-03-14 RX ADMIN — Medication 325 MILLIGRAM(S): at 11:42

## 2021-03-14 RX ADMIN — Medication 1 TABLET(S): at 14:01

## 2021-03-14 RX ADMIN — Medication 975 MILLIGRAM(S): at 21:14

## 2021-03-14 RX ADMIN — Medication 1 MILLIGRAM(S): at 11:41

## 2021-03-14 RX ADMIN — Medication 500 MILLIGRAM(S): at 05:03

## 2021-03-14 RX ADMIN — Medication 1 TABLET(S): at 05:03

## 2021-03-14 RX ADMIN — PANTOPRAZOLE SODIUM 40 MILLIGRAM(S): 20 TABLET, DELAYED RELEASE ORAL at 05:03

## 2021-03-14 RX ADMIN — Medication 975 MILLIGRAM(S): at 05:03

## 2021-03-14 RX ADMIN — Medication 81 MILLIGRAM(S): at 23:21

## 2021-03-14 RX ADMIN — GABAPENTIN 100 MILLIGRAM(S): 400 CAPSULE ORAL at 21:14

## 2021-03-14 RX ADMIN — Medication 975 MILLIGRAM(S): at 14:01

## 2021-03-14 RX ADMIN — Medication 81 MILLIGRAM(S): at 11:42

## 2021-03-14 RX ADMIN — Medication 1 TABLET(S): at 11:42

## 2021-03-14 RX ADMIN — SENNA PLUS 2 TABLET(S): 8.6 TABLET ORAL at 21:13

## 2021-03-14 RX ADMIN — LOSARTAN POTASSIUM 50 MILLIGRAM(S): 100 TABLET, FILM COATED ORAL at 05:03

## 2021-03-14 RX ADMIN — ATORVASTATIN CALCIUM 20 MILLIGRAM(S): 80 TABLET, FILM COATED ORAL at 21:14

## 2021-03-14 RX ADMIN — GABAPENTIN 100 MILLIGRAM(S): 400 CAPSULE ORAL at 14:01

## 2021-03-14 RX ADMIN — Medication 12.5 MILLIGRAM(S): at 09:52

## 2021-03-14 RX ADMIN — Medication 1 TABLET(S): at 21:13

## 2021-03-14 RX ADMIN — GABAPENTIN 100 MILLIGRAM(S): 400 CAPSULE ORAL at 05:03

## 2021-03-14 RX ADMIN — Medication 102 MILLIGRAM(S): at 11:40

## 2021-03-14 NOTE — PROGRESS NOTE ADULT - SUBJECTIVE AND OBJECTIVE BOX
CHIEF COMPLAINT: f/u right knee pain    SUBJECTIVE / OVERNIGHT EVENTS: Patient seen and examined. No acute events overnight. Pain well controlled.    MEDICATIONS  (STANDING):  acetaminophen   Tablet .. 975 milliGRAM(s) Oral every 8 hours  acetaminophen  IVPB .. 1000 milliGRAM(s) IV Intermittent once  ascorbic acid 500 milliGRAM(s) Oral two times a day  aspirin  chewable 81 milliGRAM(s) Oral two times a day  atorvastatin 20 milliGRAM(s) Oral at bedtime  calcium carbonate 1250 mG  + Vitamin D (OsCal 500 + D) 1 Tablet(s) Oral three times a day  dexAMETHasone  IVPB 10 milliGRAM(s) IV Intermittent every 8 hours  ferrous    sulfate 325 milliGRAM(s) Oral daily  folic acid 1 milliGRAM(s) Oral daily  gabapentin 100 milliGRAM(s) Oral three times a day  losartan 50 milliGRAM(s) Oral daily  multivitamin 1 Tablet(s) Oral daily  pantoprazole    Tablet 40 milliGRAM(s) Oral before breakfast  polyethylene glycol 3350 17 Gram(s) Oral at bedtime  senna 2 Tablet(s) Oral at bedtime    MEDICATIONS  (PRN):  ondansetron Injectable 4 milliGRAM(s) IV Push every 6 hours PRN Nausea and/or Vomiting  oxyCODONE    IR 5 milliGRAM(s) Oral every 3 hours PRN Moderate Pain (4 - 6)  oxyCODONE    IR 10 milliGRAM(s) Oral every 3 hours PRN Severe Pain (7 - 10)  traMADol 50 milliGRAM(s) Oral every 8 hours PRN Mild Pain (1 - 3)    VITALS:  T(F): 97.9 (03-14-21 @ 05:02), Max: 98.6 (03-13-21 @ 13:58)  HR: 73 (03-14-21 @ 05:02) (73 - 100)  BP: 164/94 (03-14-21 @ 05:02) (144/78 - 164/94)  RR: 18 (03-14-21 @ 05:02) (18 - 18)  SpO2: 96% (03-14-21 @ 05:02)    PHYSICAL EXAM:  GENERAL: NAD, well-developed  CHEST/LUNG: Clear to auscultation bilaterally; No wheeze  HEART: Regular rate and rhythm; No murmurs, rubs, or gallops  ABDOMEN: Soft, Nontender, Nondistended; Bowel sounds present  EXTREMITIES:  2+ Peripheral Pulses, No clubbing, cyanosis, or edema  SKIN: No rashes or lesions; incision C/D/I    LABS: none    RADIOLOGY & ADDITIONAL TESTS:  Imaging Personally Reviewed: [x] Yes  Right knee Xray - prelim views no findings, await final read    [ ] Consultant(s) Notes Reviewed:  [x] Care Discussed with Consultants/Other Providers: Orthopedic PA - discussed disposition CHIEF COMPLAINT: f/u right knee pain    SUBJECTIVE / OVERNIGHT EVENTS: Patient seen and examined. No acute events overnight. Pain well controlled.    MEDICATIONS  (STANDING):  acetaminophen   Tablet .. 975 milliGRAM(s) Oral every 8 hours  acetaminophen  IVPB .. 1000 milliGRAM(s) IV Intermittent once  ascorbic acid 500 milliGRAM(s) Oral two times a day  aspirin  chewable 81 milliGRAM(s) Oral two times a day  atorvastatin 20 milliGRAM(s) Oral at bedtime  calcium carbonate 1250 mG  + Vitamin D (OsCal 500 + D) 1 Tablet(s) Oral three times a day  dexAMETHasone  IVPB 10 milliGRAM(s) IV Intermittent every 8 hours  ferrous    sulfate 325 milliGRAM(s) Oral daily  folic acid 1 milliGRAM(s) Oral daily  gabapentin 100 milliGRAM(s) Oral three times a day  losartan 50 milliGRAM(s) Oral daily  multivitamin 1 Tablet(s) Oral daily  pantoprazole    Tablet 40 milliGRAM(s) Oral before breakfast  polyethylene glycol 3350 17 Gram(s) Oral at bedtime  senna 2 Tablet(s) Oral at bedtime    MEDICATIONS  (PRN):  ondansetron Injectable 4 milliGRAM(s) IV Push every 6 hours PRN Nausea and/or Vomiting  oxyCODONE    IR 5 milliGRAM(s) Oral every 3 hours PRN Moderate Pain (4 - 6)  oxyCODONE    IR 10 milliGRAM(s) Oral every 3 hours PRN Severe Pain (7 - 10)  traMADol 50 milliGRAM(s) Oral every 8 hours PRN Mild Pain (1 - 3)    VITALS:  T(F): 97.9 (03-14-21 @ 05:02), Max: 98.6 (03-13-21 @ 13:58)  HR: 73 (03-14-21 @ 05:02) (73 - 100)  BP: 164/94 (03-14-21 @ 05:02) (144/78 - 164/94)  RR: 18 (03-14-21 @ 05:02) (18 - 18)  SpO2: 96% (03-14-21 @ 05:02)    PHYSICAL EXAM:  GENERAL: NAD, well-developed  CHEST/LUNG: Clear to auscultation bilaterally; No wheeze  HEART: Regular rate and rhythm; No murmurs, rubs, or gallops  ABDOMEN: Soft, Nontender, Nondistended; Bowel sounds present  EXTREMITIES:  2+ Peripheral Pulses, No clubbing, cyanosis, or edema  SKIN: No rashes or lesions; incision C/D/I    LABS: none    RADIOLOGY & ADDITIONAL TESTS:  Imaging Personally Reviewed: [x] Yes  Right knee Xray - prelim views no findings, await final read    [ ] Consultant(s) Notes Reviewed:  [x] Care Discussed with Consultants/Other Providers: Orthopedic Resident - discussed disposition

## 2021-03-14 NOTE — PROGRESS NOTE ADULT - SUBJECTIVE AND OBJECTIVE BOX
Orthopedic Surgery Progress Note    S: Patient seen and examined at bedside, no acute events overnight.  Patient complaining of R knee pain for the past/stiffness several days, unchanged in severity.      No numbness/tingling, fevers/chills, nausea/vomiting, chest pain, shortness of breath, lightheadedness, or dizziness.    O:  Vital Signs Last 24 Hrs  T(C): 36.6 (14 Mar 2021 05:02), Max: 37 (13 Mar 2021 13:58)  T(F): 97.9 (14 Mar 2021 05:02), Max: 98.6 (13 Mar 2021 13:58)  HR: 73 (14 Mar 2021 05:02) (73 - 100)  BP: 164/94 (14 Mar 2021 05:02) (144/78 - 164/94)  BP(mean): --  RR: 18 (14 Mar 2021 05:02) (18 - 18)  SpO2: 96% (14 Mar 2021 05:02) (96% - 99%)    Gen: NAD  LLE  Dressing clean, dry, intact  EHL/FHL/TA/GS intact  SILT DP/SP/Limon/Sa  WWP distally, cap refill <2s  RLE  ROM 0 to 90 deg, pain with attempted ROM past 90 deg  Well-healed midline surgical scar  No appreciable swelling or palpable effusion  No erythema  EHL/FHL/TA/GS intact  SILT DP/SP/Limon/Sa  WWP distally, cap refill <2s      Imaging:  Xray R knee: hardware in maintained alignment/position, no acute fractures or dislocations      A/P 70y year old Male s/p L TKA POD#3, now complaining of R knee pain for the past several days    No acute pathology of R knee identified  Pain Control  WBAT  PT/OOB  DVT PPx- aspirin 81 BID  Dispo Planning- rehab likely tomorrow

## 2021-03-15 LAB
ANION GAP SERPL CALC-SCNC: 12 MMOL/L — SIGNIFICANT CHANGE UP (ref 7–14)
BLD GP AB SCN SERPL QL: NEGATIVE — SIGNIFICANT CHANGE UP
BUN SERPL-MCNC: 25 MG/DL — HIGH (ref 7–23)
CALCIUM SERPL-MCNC: 9.2 MG/DL — SIGNIFICANT CHANGE UP (ref 8.4–10.5)
CHLORIDE SERPL-SCNC: 100 MMOL/L — SIGNIFICANT CHANGE UP (ref 98–107)
CO2 SERPL-SCNC: 26 MMOL/L — SIGNIFICANT CHANGE UP (ref 22–31)
CREAT SERPL-MCNC: 0.89 MG/DL — SIGNIFICANT CHANGE UP (ref 0.5–1.3)
GLUCOSE SERPL-MCNC: 152 MG/DL — HIGH (ref 70–99)
HCT VFR BLD CALC: 27.8 % — LOW (ref 39–50)
HGB BLD-MCNC: 9.1 G/DL — LOW (ref 13–17)
MCHC RBC-ENTMCNC: 29.2 PG — SIGNIFICANT CHANGE UP (ref 27–34)
MCHC RBC-ENTMCNC: 32.7 GM/DL — SIGNIFICANT CHANGE UP (ref 32–36)
MCV RBC AUTO: 89.1 FL — SIGNIFICANT CHANGE UP (ref 80–100)
NRBC # BLD: 0 /100 WBCS — SIGNIFICANT CHANGE UP
NRBC # FLD: 0 K/UL — SIGNIFICANT CHANGE UP
PLATELET # BLD AUTO: 167 K/UL — SIGNIFICANT CHANGE UP (ref 150–400)
POTASSIUM SERPL-MCNC: 3.7 MMOL/L — SIGNIFICANT CHANGE UP (ref 3.5–5.3)
POTASSIUM SERPL-SCNC: 3.7 MMOL/L — SIGNIFICANT CHANGE UP (ref 3.5–5.3)
RBC # BLD: 3.12 M/UL — LOW (ref 4.2–5.8)
RBC # FLD: 13.5 % — SIGNIFICANT CHANGE UP (ref 10.3–14.5)
RH IG SCN BLD-IMP: POSITIVE — SIGNIFICANT CHANGE UP
SARS-COV-2 RNA SPEC QL NAA+PROBE: SIGNIFICANT CHANGE UP
SODIUM SERPL-SCNC: 138 MMOL/L — SIGNIFICANT CHANGE UP (ref 135–145)
WBC # BLD: 8.57 K/UL — SIGNIFICANT CHANGE UP (ref 3.8–10.5)
WBC # FLD AUTO: 8.57 K/UL — SIGNIFICANT CHANGE UP (ref 3.8–10.5)

## 2021-03-15 PROCEDURE — 99233 SBSQ HOSP IP/OBS HIGH 50: CPT

## 2021-03-15 RX ORDER — LACTULOSE 10 G/15ML
10 SOLUTION ORAL ONCE
Refills: 0 | Status: COMPLETED | OUTPATIENT
Start: 2021-03-15 | End: 2021-03-15

## 2021-03-15 RX ORDER — HYDROCHLOROTHIAZIDE 25 MG
12.5 TABLET ORAL DAILY
Refills: 0 | Status: DISCONTINUED | OUTPATIENT
Start: 2021-03-15 | End: 2021-03-17

## 2021-03-15 RX ADMIN — LACTULOSE 10 GRAM(S): 10 SOLUTION ORAL at 11:34

## 2021-03-15 RX ADMIN — PANTOPRAZOLE SODIUM 40 MILLIGRAM(S): 20 TABLET, DELAYED RELEASE ORAL at 05:39

## 2021-03-15 RX ADMIN — Medication 12.5 MILLIGRAM(S): at 14:05

## 2021-03-15 RX ADMIN — Medication 975 MILLIGRAM(S): at 22:18

## 2021-03-15 RX ADMIN — GABAPENTIN 100 MILLIGRAM(S): 400 CAPSULE ORAL at 05:39

## 2021-03-15 RX ADMIN — ATORVASTATIN CALCIUM 20 MILLIGRAM(S): 80 TABLET, FILM COATED ORAL at 22:18

## 2021-03-15 RX ADMIN — Medication 81 MILLIGRAM(S): at 11:34

## 2021-03-15 RX ADMIN — Medication 975 MILLIGRAM(S): at 14:05

## 2021-03-15 RX ADMIN — Medication 10 MILLIGRAM(S): at 10:18

## 2021-03-15 RX ADMIN — Medication 81 MILLIGRAM(S): at 23:39

## 2021-03-15 RX ADMIN — SENNA PLUS 2 TABLET(S): 8.6 TABLET ORAL at 22:18

## 2021-03-15 RX ADMIN — Medication 1 TABLET(S): at 22:18

## 2021-03-15 RX ADMIN — Medication 975 MILLIGRAM(S): at 05:40

## 2021-03-15 RX ADMIN — Medication 1 TABLET(S): at 14:05

## 2021-03-15 RX ADMIN — Medication 325 MILLIGRAM(S): at 11:34

## 2021-03-15 RX ADMIN — Medication 1 TABLET(S): at 11:34

## 2021-03-15 RX ADMIN — Medication 500 MILLIGRAM(S): at 05:39

## 2021-03-15 RX ADMIN — GABAPENTIN 100 MILLIGRAM(S): 400 CAPSULE ORAL at 14:05

## 2021-03-15 RX ADMIN — Medication 1 MILLIGRAM(S): at 11:34

## 2021-03-15 RX ADMIN — Medication 500 MILLIGRAM(S): at 17:09

## 2021-03-15 RX ADMIN — Medication 102 MILLIGRAM(S): at 03:25

## 2021-03-15 RX ADMIN — GABAPENTIN 100 MILLIGRAM(S): 400 CAPSULE ORAL at 22:18

## 2021-03-15 RX ADMIN — LOSARTAN POTASSIUM 50 MILLIGRAM(S): 100 TABLET, FILM COATED ORAL at 05:39

## 2021-03-15 RX ADMIN — Medication 1 TABLET(S): at 05:39

## 2021-03-15 NOTE — PROGRESS NOTE ADULT - SUBJECTIVE AND OBJECTIVE BOX
Ortho Progress Note  RIZWAN BULL   MRN-[id number]    70yMale is s/p elective L TKA POD#4 w/out any c/o.  Resting comfortably, NAD, Alert and awake    Vital Signs Last 24 Hrs  T(C): 36.6 (15 Mar 2021 05:36), Max: 37.2 (14 Mar 2021 17:19)  T(F): 97.8 (15 Mar 2021 05:36), Max: 99 (14 Mar 2021 17:19)  HR: 92 (15 Mar 2021 05:36) (80 - 95)  BP: 153/112 (15 Mar 2021 05:36) (142/75 - 168/78)  BP(mean): --  RR: 18 (15 Mar 2021 05:36) (17 - 18)  SpO2: 97% (15 Mar 2021 05:36) (96% - 98%)  No Known Allergies      S/P elective L TKA  L knee wound dressing/ace wrap is C/D/I  able to SLR  motor LLE EHL/TA/GS 5/5 intact  sensation intact to light touch            A/P Ortho Stable s/p elective L TKA POD#4  continue Aspirin for DVT ppx  continue Physical Therapy BID: WBAT, OOB for gait training  discharge planning to REHAB - will need Covid swab for rehab

## 2021-03-15 NOTE — PROGRESS NOTE ADULT - SUBJECTIVE AND OBJECTIVE BOX
CHIEF COMPLAINT: f/u     SUBJECTIVE / OVERNIGHT EVENTS: Patient seen and examined. No acute events overnight. Spoke to patient via translation provided by patient's son Alverto over the phone per patient request. Reports that his pain well controlled. Has not had BM - per son, hot milk usually stimulates BM for patient. Denies abd pain, urinating well. BP noted to be elevated.     MEDICATIONS  (STANDING):  acetaminophen   Tablet .. 975 milliGRAM(s) Oral every 8 hours  acetaminophen  IVPB .. 1000 milliGRAM(s) IV Intermittent once  ascorbic acid 500 milliGRAM(s) Oral two times a day  aspirin  chewable 81 milliGRAM(s) Oral two times a day  atorvastatin 20 milliGRAM(s) Oral at bedtime  calcium carbonate 1250 mG  + Vitamin D (OsCal 500 + D) 1 Tablet(s) Oral three times a day  ferrous    sulfate 325 milliGRAM(s) Oral daily  folic acid 1 milliGRAM(s) Oral daily  gabapentin 100 milliGRAM(s) Oral three times a day  hydrochlorothiazide 12.5 milliGRAM(s) Oral daily  losartan 50 milliGRAM(s) Oral daily  multivitamin 1 Tablet(s) Oral daily  pantoprazole    Tablet 40 milliGRAM(s) Oral before breakfast  polyethylene glycol 3350 17 Gram(s) Oral at bedtime  senna 2 Tablet(s) Oral at bedtime    MEDICATIONS  (PRN):  bisacodyl Suppository 10 milliGRAM(s) Rectal daily PRN Constipation  ondansetron Injectable 4 milliGRAM(s) IV Push every 6 hours PRN Nausea and/or Vomiting  oxyCODONE    IR 5 milliGRAM(s) Oral every 3 hours PRN Moderate Pain (4 - 6)  oxyCODONE    IR 10 milliGRAM(s) Oral every 3 hours PRN Severe Pain (7 - 10)  traMADol 50 milliGRAM(s) Oral every 8 hours PRN Mild Pain (1 - 3)      VITALS:  T(F): 98.1 (03-15-21 @ 09:17), Max: 99 (03-14-21 @ 17:19)  HR: 102 (03-15-21 @ 09:17) (80 - 102)  BP: 144/77 (03-15-21 @ 09:17) (142/75 - 168/78)  RR: 18 (03-15-21 @ 09:17) (18 - 18)  SpO2: 97% (03-15-21 @ 09:17)  Wt(kg): --      PHYSICAL EXAM:  GENERAL: elderly male in NAD, well-developed  CHEST/LUNG: Clear to auscultation bilaterally; No wheeze  HEART: Regular rate and rhythm; No murmurs, rubs, or gallops  ABDOMEN: Soft, Nontender, mildly distended; Bowel sounds present  EXTREMITIES:  L knee wound dressing/ace wrap is C/D/I  PSYCH: AAOx3  NEUROLOGY: non-focal, sensation grossly intact     LABS:          RADIOLOGY & ADDITIONAL TESTS:  Imaging Personally Reviewed: [x] Yes  R Knee Xray: IMPRESSION: No fracture or dislocation is seen. The patient is status post right knee replacement with patellar resurfacing. There is no significant joint effusion. The surgical hardware is intact with no evidence of hardware loosening. There is no significant soft tissue swelling.      [ ] Consultant(s) Notes Reviewed:  [x] Care Discussed with Consultants/Other Providers: Orthopedic PA - discussed

## 2021-03-16 ENCOUNTER — APPOINTMENT (OUTPATIENT)
Dept: ORTHOPEDIC SURGERY | Facility: CLINIC | Age: 71
End: 2021-03-16

## 2021-03-16 LAB
HCT VFR BLD CALC: 28 % — LOW (ref 39–50)
HGB BLD-MCNC: 9 G/DL — LOW (ref 13–17)
MCHC RBC-ENTMCNC: 28.7 PG — SIGNIFICANT CHANGE UP (ref 27–34)
MCHC RBC-ENTMCNC: 32.1 GM/DL — SIGNIFICANT CHANGE UP (ref 32–36)
MCV RBC AUTO: 89.2 FL — SIGNIFICANT CHANGE UP (ref 80–100)
NRBC # BLD: 0 /100 WBCS — SIGNIFICANT CHANGE UP
NRBC # FLD: 0 K/UL — SIGNIFICANT CHANGE UP
PLATELET # BLD AUTO: 154 K/UL — SIGNIFICANT CHANGE UP (ref 150–400)
RBC # BLD: 3.14 M/UL — LOW (ref 4.2–5.8)
RBC # FLD: 13.6 % — SIGNIFICANT CHANGE UP (ref 10.3–14.5)
SURGICAL PATHOLOGY STUDY: SIGNIFICANT CHANGE UP
WBC # BLD: 5.74 K/UL — SIGNIFICANT CHANGE UP (ref 3.8–10.5)
WBC # FLD AUTO: 5.74 K/UL — SIGNIFICANT CHANGE UP (ref 3.8–10.5)

## 2021-03-16 PROCEDURE — 99232 SBSQ HOSP IP/OBS MODERATE 35: CPT

## 2021-03-16 RX ORDER — CHOLECALCIFEROL (VITAMIN D3) 125 MCG
1 CAPSULE ORAL
Qty: 0 | Refills: 0 | DISCHARGE

## 2021-03-16 RX ORDER — TRAMADOL HYDROCHLORIDE 50 MG/1
1 TABLET ORAL
Qty: 0 | Refills: 0 | DISCHARGE
Start: 2021-03-16

## 2021-03-16 RX ORDER — ACETAMINOPHEN 500 MG
3 TABLET ORAL
Qty: 0 | Refills: 0 | DISCHARGE
Start: 2021-03-16

## 2021-03-16 RX ORDER — SENNA PLUS 8.6 MG/1
2 TABLET ORAL
Qty: 0 | Refills: 0 | DISCHARGE
Start: 2021-03-16

## 2021-03-16 RX ORDER — OXYCODONE HYDROCHLORIDE 5 MG/1
1 TABLET ORAL
Qty: 0 | Refills: 0 | DISCHARGE
Start: 2021-03-16

## 2021-03-16 RX ORDER — GABAPENTIN 400 MG/1
1 CAPSULE ORAL
Qty: 0 | Refills: 0 | DISCHARGE
Start: 2021-03-16

## 2021-03-16 RX ORDER — GABAPENTIN 400 MG/1
1 CAPSULE ORAL
Qty: 0 | Refills: 0 | DISCHARGE

## 2021-03-16 RX ORDER — LOSARTAN POTASSIUM 100 MG/1
1 TABLET, FILM COATED ORAL
Qty: 0 | Refills: 0 | DISCHARGE

## 2021-03-16 RX ORDER — DOCUSATE SODIUM 100 MG
1 CAPSULE ORAL
Qty: 0 | Refills: 0 | DISCHARGE

## 2021-03-16 RX ORDER — FOLIC ACID 0.8 MG
1 TABLET ORAL
Qty: 0 | Refills: 0 | DISCHARGE
Start: 2021-03-16

## 2021-03-16 RX ORDER — ASPIRIN/CALCIUM CARB/MAGNESIUM 324 MG
1 TABLET ORAL
Qty: 0 | Refills: 0 | DISCHARGE
Start: 2021-03-16

## 2021-03-16 RX ORDER — LOSARTAN POTASSIUM 100 MG/1
1 TABLET, FILM COATED ORAL
Qty: 0 | Refills: 0 | DISCHARGE
Start: 2021-03-16

## 2021-03-16 RX ORDER — METOCLOPRAMIDE HCL 10 MG
1 TABLET ORAL
Qty: 0 | Refills: 0 | DISCHARGE

## 2021-03-16 RX ORDER — ASCORBIC ACID 60 MG
1 TABLET,CHEWABLE ORAL
Qty: 0 | Refills: 0 | DISCHARGE
Start: 2021-03-16

## 2021-03-16 RX ORDER — FERROUS SULFATE 325(65) MG
1 TABLET ORAL
Qty: 0 | Refills: 0 | DISCHARGE
Start: 2021-03-16

## 2021-03-16 RX ADMIN — Medication 1 TABLET(S): at 11:43

## 2021-03-16 RX ADMIN — Medication 1 TABLET(S): at 05:21

## 2021-03-16 RX ADMIN — PANTOPRAZOLE SODIUM 40 MILLIGRAM(S): 20 TABLET, DELAYED RELEASE ORAL at 05:21

## 2021-03-16 RX ADMIN — Medication 12.5 MILLIGRAM(S): at 05:21

## 2021-03-16 RX ADMIN — Medication 1 TABLET(S): at 13:33

## 2021-03-16 RX ADMIN — POLYETHYLENE GLYCOL 3350 17 GRAM(S): 17 POWDER, FOR SOLUTION ORAL at 05:21

## 2021-03-16 RX ADMIN — GABAPENTIN 100 MILLIGRAM(S): 400 CAPSULE ORAL at 05:21

## 2021-03-16 RX ADMIN — POLYETHYLENE GLYCOL 3350 17 GRAM(S): 17 POWDER, FOR SOLUTION ORAL at 21:45

## 2021-03-16 RX ADMIN — Medication 975 MILLIGRAM(S): at 05:21

## 2021-03-16 RX ADMIN — Medication 81 MILLIGRAM(S): at 23:43

## 2021-03-16 RX ADMIN — GABAPENTIN 100 MILLIGRAM(S): 400 CAPSULE ORAL at 21:45

## 2021-03-16 RX ADMIN — SENNA PLUS 2 TABLET(S): 8.6 TABLET ORAL at 21:45

## 2021-03-16 RX ADMIN — GABAPENTIN 100 MILLIGRAM(S): 400 CAPSULE ORAL at 13:33

## 2021-03-16 RX ADMIN — Medication 500 MILLIGRAM(S): at 17:26

## 2021-03-16 RX ADMIN — Medication 975 MILLIGRAM(S): at 21:45

## 2021-03-16 RX ADMIN — Medication 325 MILLIGRAM(S): at 11:43

## 2021-03-16 RX ADMIN — Medication 81 MILLIGRAM(S): at 11:43

## 2021-03-16 RX ADMIN — Medication 1 TABLET(S): at 21:45

## 2021-03-16 RX ADMIN — Medication 975 MILLIGRAM(S): at 13:33

## 2021-03-16 RX ADMIN — Medication 1 MILLIGRAM(S): at 11:43

## 2021-03-16 RX ADMIN — LOSARTAN POTASSIUM 50 MILLIGRAM(S): 100 TABLET, FILM COATED ORAL at 05:21

## 2021-03-16 RX ADMIN — ATORVASTATIN CALCIUM 20 MILLIGRAM(S): 80 TABLET, FILM COATED ORAL at 21:45

## 2021-03-16 RX ADMIN — Medication 500 MILLIGRAM(S): at 05:21

## 2021-03-16 NOTE — PROGRESS NOTE ADULT - SUBJECTIVE AND OBJECTIVE BOX
[facility] Division of Hospital Medicine  Elvira Kurtz MD  Pager: [phone]      Patient is a 70y old  Male who presents with a chief complaint of elective L TKA 3/11/2021 (16 Mar 2021 06:44)      SUBJECTIVE / OVERNIGHT EVENTS: Patient seen and examined. No acute events overnight. Spoke to patient via translation provided by patient's son Alverto over the phone per patient request. Reports that his pain well controlled, denies R knee pain. Had small BM yesterday but still feels constipated. Endorsing dry mouth last night, drank a lot of water and now feels better. Denies cough or sore throat.     MEDICATIONS  (STANDING):  acetaminophen   Tablet .. 975 milliGRAM(s) Oral every 8 hours  acetaminophen  IVPB .. 1000 milliGRAM(s) IV Intermittent once  ascorbic acid 500 milliGRAM(s) Oral two times a day  aspirin  chewable 81 milliGRAM(s) Oral two times a day  atorvastatin 20 milliGRAM(s) Oral at bedtime  calcium carbonate 1250 mG  + Vitamin D (OsCal 500 + D) 1 Tablet(s) Oral three times a day  ferrous    sulfate 325 milliGRAM(s) Oral daily  folic acid 1 milliGRAM(s) Oral daily  gabapentin 100 milliGRAM(s) Oral three times a day  hydrochlorothiazide 12.5 milliGRAM(s) Oral daily  losartan 50 milliGRAM(s) Oral daily  multivitamin 1 Tablet(s) Oral daily  pantoprazole    Tablet 40 milliGRAM(s) Oral before breakfast  polyethylene glycol 3350 17 Gram(s) Oral at bedtime  senna 2 Tablet(s) Oral at bedtime    MEDICATIONS  (PRN):  bisacodyl Suppository 10 milliGRAM(s) Rectal daily PRN Constipation  ondansetron Injectable 4 milliGRAM(s) IV Push every 6 hours PRN Nausea and/or Vomiting  oxyCODONE    IR 5 milliGRAM(s) Oral every 3 hours PRN Moderate Pain (4 - 6)  oxyCODONE    IR 10 milliGRAM(s) Oral every 3 hours PRN Severe Pain (7 - 10)  traMADol 50 milliGRAM(s) Oral every 8 hours PRN Mild Pain (1 - 3)      CAPILLARY BLOOD GLUCOSE        I&O's Summary    15 Mar 2021 07:01  -  16 Mar 2021 07:00  --------------------------------------------------------  IN: 0 mL / OUT: 0 mL / NET: 0 mL        PHYSICAL EXAM:  Vital Signs Last 24 Hrs  T(C): 36.9 (16 Mar 2021 09:40), Max: 37.2 (15 Mar 2021 14:04)  T(F): 98.5 (16 Mar 2021 09:40), Max: 99 (15 Mar 2021 14:04)  HR: 89 (16 Mar 2021 09:40) (71 - 89)  BP: 124/66 (16 Mar 2021 09:40) (124/66 - 153/92)  BP(mean): --  RR: 18 (16 Mar 2021 09:40) (16 - 18)  SpO2: 100% (16 Mar 2021 09:40) (96% - 100%)    PHYSICAL EXAM:  GENERAL: elderly male in NAD, well-developed  CHEST/LUNG: Clear to auscultation bilaterally; No wheeze  HEART: Regular rate and rhythm; No murmurs, rubs, or gallops  ABDOMEN: Soft, Nontender, mildly distended; Bowel sounds present  EXTREMITIES:  L knee wound dressing/ace wrap is C/D/I  PSYCH: AAOx3  NEUROLOGY: non-focal, sensation grossly intact       LABS:                        9.0    5.74  )-----------( 154      ( 16 Mar 2021 07:54 )             28.0     03-15    138  |  100  |  25<H>  ----------------------------<  152<H>  3.7   |  26  |  0.89    Ca    9.2      15 Mar 2021 13:29                  RADIOLOGY & ADDITIONAL TESTS:  Results Reviewed:   Imaging Personally Reviewed:  Electrocardiogram Personally Reviewed:    COORDINATION OF CARE:  Care Discussed with Consultants/Other Providers [Y/N]:  Prior or Outpatient Records Reviewed [Y/N]:

## 2021-03-16 NOTE — PROGRESS NOTE ADULT - SUBJECTIVE AND OBJECTIVE BOX
Ortho Progress Note  RIZWAN BULL   MRN-[id number]    Patient seen and evaluated this AM.   Pain much better controlled.  Has been OOB.  Walking w/out difficulty.     ICU Vital Signs Last 24 Hrs  T(C): 36.8 (16 Mar 2021 05:19), Max: 37.2 (15 Mar 2021 14:04)  T(F): 98.2 (16 Mar 2021 05:19), Max: 99 (15 Mar 2021 14:04)  HR: 71 (16 Mar 2021 05:19) (71 - 102)  BP: 135/81 (16 Mar 2021 05:19) (135/81 - 153/92)  BP(mean): --  ABP: --  ABP(mean): --  RR: 16 (16 Mar 2021 05:19) (16 - 18)  SpO2: 100% (16 Mar 2021 05:19) (96% - 100%)        S/P elective L TKA  L knee wound dressing/ace wrap is C/D/I  able to SLR  motor LLE EHL/TA/GS 5/5 intact  sensation intact to light touch            A/P Ortho Stable s/p elective L TKA, progressing well  continue Aspirin for DVT ppx  continue Physical Therapy BID: WBAT, OOB for gait training  discharge planning to REHAB - will need Covid swab for rehab

## 2021-03-17 VITALS
OXYGEN SATURATION: 97 % | HEART RATE: 98 BPM | DIASTOLIC BLOOD PRESSURE: 65 MMHG | RESPIRATION RATE: 18 BRPM | TEMPERATURE: 98 F | SYSTOLIC BLOOD PRESSURE: 125 MMHG

## 2021-03-17 PROCEDURE — 99232 SBSQ HOSP IP/OBS MODERATE 35: CPT

## 2021-03-17 RX ORDER — TRAMADOL HYDROCHLORIDE 50 MG/1
50 TABLET ORAL EVERY 8 HOURS
Refills: 0 | Status: DISCONTINUED | OUTPATIENT
Start: 2021-03-17 | End: 2021-03-17

## 2021-03-17 RX ORDER — OXYCODONE HYDROCHLORIDE 5 MG/1
5 TABLET ORAL
Refills: 0 | Status: DISCONTINUED | OUTPATIENT
Start: 2021-03-17 | End: 2021-03-17

## 2021-03-17 RX ORDER — ASCORBIC ACID 60 MG
1 TABLET,CHEWABLE ORAL
Qty: 0 | Refills: 0 | DISCHARGE
Start: 2021-03-17

## 2021-03-17 RX ORDER — ASCORBIC ACID 60 MG
500 TABLET,CHEWABLE ORAL DAILY
Refills: 0 | Status: DISCONTINUED | OUTPATIENT
Start: 2021-03-17 | End: 2021-03-17

## 2021-03-17 RX ORDER — LACTULOSE 10 G/15ML
10 SOLUTION ORAL ONCE
Refills: 0 | Status: COMPLETED | OUTPATIENT
Start: 2021-03-17 | End: 2021-03-17

## 2021-03-17 RX ORDER — OXYCODONE HYDROCHLORIDE 5 MG/1
10 TABLET ORAL
Refills: 0 | Status: DISCONTINUED | OUTPATIENT
Start: 2021-03-17 | End: 2021-03-17

## 2021-03-17 RX ORDER — OXYCODONE HYDROCHLORIDE 5 MG/1
1 TABLET ORAL
Qty: 0 | Refills: 0 | DISCHARGE
Start: 2021-03-17

## 2021-03-17 RX ORDER — PANTOPRAZOLE SODIUM 20 MG/1
1 TABLET, DELAYED RELEASE ORAL
Qty: 0 | Refills: 0 | DISCHARGE
Start: 2021-03-17

## 2021-03-17 RX ORDER — TRAMADOL HYDROCHLORIDE 50 MG/1
1 TABLET ORAL
Qty: 0 | Refills: 0 | DISCHARGE
Start: 2021-03-17 | End: 2021-03-24

## 2021-03-17 RX ADMIN — LOSARTAN POTASSIUM 50 MILLIGRAM(S): 100 TABLET, FILM COATED ORAL at 05:37

## 2021-03-17 RX ADMIN — Medication 81 MILLIGRAM(S): at 11:13

## 2021-03-17 RX ADMIN — Medication 500 MILLIGRAM(S): at 05:37

## 2021-03-17 RX ADMIN — GABAPENTIN 100 MILLIGRAM(S): 400 CAPSULE ORAL at 05:37

## 2021-03-17 RX ADMIN — PANTOPRAZOLE SODIUM 40 MILLIGRAM(S): 20 TABLET, DELAYED RELEASE ORAL at 05:37

## 2021-03-17 RX ADMIN — Medication 975 MILLIGRAM(S): at 14:46

## 2021-03-17 RX ADMIN — TRAMADOL HYDROCHLORIDE 50 MILLIGRAM(S): 50 TABLET ORAL at 11:13

## 2021-03-17 RX ADMIN — LACTULOSE 10 GRAM(S): 10 SOLUTION ORAL at 11:10

## 2021-03-17 RX ADMIN — Medication 1 TABLET(S): at 05:37

## 2021-03-17 RX ADMIN — Medication 12.5 MILLIGRAM(S): at 05:37

## 2021-03-17 RX ADMIN — Medication 975 MILLIGRAM(S): at 05:37

## 2021-03-17 RX ADMIN — Medication 500 MILLIGRAM(S): at 11:13

## 2021-03-17 RX ADMIN — Medication 1 TABLET(S): at 11:13

## 2021-03-17 RX ADMIN — GABAPENTIN 100 MILLIGRAM(S): 400 CAPSULE ORAL at 14:47

## 2021-03-17 NOTE — PROGRESS NOTE ADULT - PROBLEM SELECTOR PROBLEM 5
Hyperlipidemia
Hyperlipidemia
Postoperative anemia due to acute blood loss

## 2021-03-17 NOTE — PROGRESS NOTE ADULT - PROBLEM/PLAN-7
Post-Operative Progess Note


Pre-Operative Diagnosis


BILIARY DYSKINESIA





Post-Operative Diagnosis





same





Post-Op Procedure Note


Date of Procedure:  Jan 5, 2017


Name of Procedure:  


laparoscopic cholecystectomy


Anesthesia Type


GET


Estimated blood loss (mL):  minimal


Specimen(s) collected


gallbladder








AP ROWE MD Jan 5, 2017 9:27 am
DISPLAY PLAN FREE TEXT

## 2021-03-17 NOTE — PROGRESS NOTE ADULT - ASSESSMENT
70M h/o glaucoma, OA of knee, hyperlipidemia, GERD, HTN s/p right total knee replacement (12/2020) and s/p right inguinal hernia repair (2014) presents for scheduled left total knee replacement on 3/11/21.  
70M h/o glaucoma, OA of knee, hyperlipidemia, GERD, HTN s/p right total knee replacement (12/2020) and s/p right inguinal hernia repair (2014) s/p left total knee replacement on 3/11/21.  

## 2021-03-17 NOTE — PROGRESS NOTE ADULT - PROVIDER SPECIALTY LIST ADULT
Orthopedics
Anesthesia
Orthopedics
Hospitalist

## 2021-03-17 NOTE — PROGRESS NOTE ADULT - PROBLEM SELECTOR PLAN 8
-DVT ppx: c/w ASA BID per orthopedic protocol.  -Dispo: PT eval. [facility] vs. home.
-c/w ASA BID per orthopedic protocol.    9. Dispo:  -patient will need rehab -- likely d/c Monday per primary team
-DVT ppx: c/w ASA BID per orthopedic protocol.  -Dispo: [facility] vs. home if clinically improves
-DVT ppx: c/w ASA BID per orthopedic protocol.  -Dispo: [facility]
-c/w ASA BID per orthopedic protocol.    9. Dispo:  -patient will need rehab -- likely d/c Monday per primary team

## 2021-03-17 NOTE — PROGRESS NOTE ADULT - PROBLEM SELECTOR PLAN 3
-likely 2/2 dehydration, s/p IVF
-likely 2/2 dehydration, resolved s/p IVF
-likely 2/2 dehydration, s/p IVF  -f/u BMP in am
-likely 2/2 dehydration, s/p IVF  -Check BMP today
-likely 2/2 dehydration, resolved s/p IVF

## 2021-03-17 NOTE — PROGRESS NOTE ADULT - NSHPATTENDINGPLANDISCUSS_GEN_ALL_CORE
patient/ patient's son/ ortho team
Ortho resident
patient/ patient's son/ ortho team
Ortho resident
patient/ patient's son/ ortho team

## 2021-03-17 NOTE — PROGRESS NOTE ADULT - PROBLEM SELECTOR PLAN 5
-c/w lipitor.
-c/w lipitor.
-likely secondary to post-op changes; pt hemodynamically stable  -CBC stable
-likely secondary to post-op changes; pt hemodynamically stable  -CBC stable
-likely secondary to post-op changes; pt hemodynamically stable  -check CBC today

## 2021-03-17 NOTE — PROGRESS NOTE ADULT - PROBLEM SELECTOR PLAN 1
-Pain well controlled; continue management and pain control per ortho recs with oxycodone IR, toradol q6hrs, oxycodone IR prn and tramadol prn  -c/w bowel regimen  -c/w incentive spirometer use  -c/w PT.
-Pain well controlled; continue management and pain control per ortho recs with oxycodone IR, toradol q6hrs, oxycodone IR prn and tramadol prn  -c/w bowel regimen  -c/w incentive spirometer use  -c/w PT.
-Pain well controlled; continue management and pain control per ortho recs with oxycodone IR, toradol q6hrs, oxycodone IR prn and tramadol prn  -c/w bowel regimen - added lactulose today   -c/w incentive spirometer use  -c/w PT.
-Pain well controlled; continue management and pain control per ortho recs with oxycodone IR, toradol q6hrs, oxycodone IR prn and tramadol prn  -c/w bowel regimen  -c/w incentive spirometer use  -c/w PT.
-Pain well controlled; continue management and pain control per ortho recs with oxycodone IR, toradol q6hrs, oxycodone IR prn and tramadol prn  -c/w bowel regimen  -c/w incentive spirometer use  -c/w PT.

## 2021-03-17 NOTE — PROGRESS NOTE ADULT - SUBJECTIVE AND OBJECTIVE BOX
Ortho Progress Note  RIZWAN BULL   [id number]    70yMale is s/p elective L TKA POD#6 w/out any c/o.  Resting comfortably, NAD, Alert and awake    Vital Signs Last 24 Hrs  T(C): 37.1 (17 Mar 2021 05:35), Max: 37.1 (16 Mar 2021 17:48)  T(F): 98.8 (17 Mar 2021 05:35), Max: 98.8 (16 Mar 2021 17:48)  HR: 73 (17 Mar 2021 05:35) (73 - 92)  BP: 124/65 (17 Mar 2021 05:35) (113/74 - 139/71)  BP(mean): --  RR: 16 (17 Mar 2021 05:35) (16 - 18)  SpO2: 99% (17 Mar 2021 05:35) (98% - 100%)  No Known Allergies      S/P elective L TKA  L knee wound dressing/ace wrap is C/D/I  able to SLR  motor LLE EHL/TA/GS 5/5 intact  sensation intact to light touch                            9.0    5.74  )-----------( 154      ( 16 Mar 2021 07:54 )             28.0     03-15    138  |  100  |  25<H>  ----------------------------<  152<H>  3.7   |  26  |  0.89    Ca    9.2      15 Mar 2021 13:29          A/P Ortho Stable s/p elective L TKA POD#6  continue Aspirin for DVT ppx  continue Physical Therapy BID: WBAT, OOB for gait training  discharge planning to Rehab - still awaiting ins auth, last Covid 3/15 is neg

## 2021-03-17 NOTE — PROGRESS NOTE ADULT - PROBLEM SELECTOR PROBLEM 1
Primary osteoarthritis of left knee

## 2021-03-17 NOTE — PROGRESS NOTE ADULT - PROBLEM SELECTOR PLAN 6
-hold HCTZ post-op; c/w losartan.
-c/w lipitor.
-resume HCTZ now that BP slightly increasing  -c/w losartan.
-c/w lipitor.
-c/w lipitor.

## 2021-03-17 NOTE — PROGRESS NOTE ADULT - PROBLEM SELECTOR PROBLEM 4
Hypertension
Postoperative anemia due to acute blood loss
Postoperative anemia due to acute blood loss
Hypertension
Hypertension

## 2021-03-17 NOTE — PROGRESS NOTE ADULT - PROBLEM SELECTOR PLAN 4
-likely secondary to post-op changes; pt hemodynamically stable; will monitor.
-likely secondary to post-op changes; pt hemodynamically stable; will monitor.
BP well controlled   -c/w home losartan 50mg and HCTZ 12.5mg   -DASH diet  -Monitor BP and can uptitrate home meds if necessary
BP noted to be elevated   -c/w losartan 50mg  -restart home HCTZ 12.5mg   -DASH diet  -Monitor BP and can uptitrate home meds if necessary
BP well controlled   -c/w home losartan 50mg and HCTZ 12.5mg   -DASH diet  -Monitor BP and can uptitrate home meds if necessary

## 2021-03-19 PROBLEM — E78.5 HYPERLIPIDEMIA, UNSPECIFIED: Chronic | Status: ACTIVE | Noted: 2021-02-23

## 2021-03-19 PROBLEM — Z86.69 PERSONAL HISTORY OF OTHER DISEASES OF THE NERVOUS SYSTEM AND SENSE ORGANS: Chronic | Status: ACTIVE | Noted: 2021-02-23

## 2021-03-19 PROBLEM — M17.10 UNILATERAL PRIMARY OSTEOARTHRITIS, UNSPECIFIED KNEE: Chronic | Status: ACTIVE | Noted: 2021-02-23

## 2021-03-26 ENCOUNTER — APPOINTMENT (OUTPATIENT)
Dept: ORTHOPEDIC SURGERY | Facility: CLINIC | Age: 71
End: 2021-03-26

## 2021-03-29 ENCOUNTER — APPOINTMENT (OUTPATIENT)
Dept: ORTHOPEDIC SURGERY | Facility: CLINIC | Age: 71
End: 2021-03-29
Payer: MEDICAID

## 2021-03-29 PROCEDURE — 99024 POSTOP FOLLOW-UP VISIT: CPT

## 2021-03-29 PROCEDURE — 73562 X-RAY EXAM OF KNEE 3: CPT | Mod: LT

## 2021-03-29 RX ORDER — MULTIVIT-MIN/FOLIC/VIT K/LYCOP 400-300MCG
25 MCG TABLET ORAL
Qty: 30 | Refills: 0 | Status: ACTIVE | COMMUNITY
Start: 2021-01-27

## 2021-03-29 RX ORDER — METHOCARBAMOL 750 MG/1
750 TABLET, FILM COATED ORAL
Qty: 30 | Refills: 0 | Status: ACTIVE | COMMUNITY
Start: 2020-11-24

## 2021-03-29 RX ORDER — SENNOSIDES 8.6 MG TABLETS 8.6 MG/1
8.6 TABLET ORAL
Qty: 60 | Refills: 0 | Status: ACTIVE | COMMUNITY
Start: 2021-01-27

## 2021-03-29 RX ORDER — FAMOTIDINE 20 MG/1
20 TABLET, FILM COATED ORAL
Qty: 60 | Refills: 0 | Status: ACTIVE | COMMUNITY
Start: 2021-01-27

## 2021-03-29 RX ORDER — GABAPENTIN 100 MG/1
100 CAPSULE ORAL
Qty: 90 | Refills: 0 | Status: ACTIVE | COMMUNITY
Start: 2021-01-27

## 2021-03-29 RX ORDER — HYDROCHLOROTHIAZIDE 12.5 MG/1
12.5 CAPSULE ORAL
Qty: 30 | Refills: 0 | Status: ACTIVE | COMMUNITY
Start: 2021-01-27

## 2021-03-29 RX ORDER — ERGOCALCIFEROL 1.25 MG/1
1.25 MG CAPSULE, LIQUID FILLED ORAL
Qty: 3 | Refills: 0 | Status: ACTIVE | COMMUNITY
Start: 2021-01-27

## 2021-03-29 RX ORDER — METOCLOPRAMIDE 10 MG/1
10 TABLET ORAL
Qty: 90 | Refills: 0 | Status: ACTIVE | COMMUNITY
Start: 2021-01-27

## 2021-03-29 RX ORDER — POLYETHYLENE GLYCOL 3350 17 G/17G
17 POWDER, FOR SOLUTION ORAL
Qty: 510 | Refills: 0 | Status: ACTIVE | COMMUNITY
Start: 2021-01-27

## 2021-03-29 RX ORDER — ETODOLAC 400 MG/1
400 TABLET, FILM COATED ORAL
Qty: 30 | Refills: 0 | Status: ACTIVE | COMMUNITY
Start: 2020-11-24

## 2021-03-29 NOTE — HISTORY OF PRESENT ILLNESS
[___ Weeks Post Op] : [unfilled] weeks post op [4] : the patient reports pain that is 4/10 in severity [Clean/Dry/Intact] : clean, dry and intact [Healed] : healed [Swelling] : swollen [Chills] : no chills [Constipation] : no constipation [Diarrhea] : no diarrhea [Dysuria] : no dysuria [Fever] : no fever [Nausea] : no nausea [Vomiting] : no vomiting [Erythema] : not erythematous [Discharge] : absent of discharge [Dehiscence] : not dehisced [de-identified] : Pt returns for follow up for his first post op visit, surgical intervention on 3/11/2021 to the left knee. Pt is residing at a rehab.Pt is taking ASA daily.  Pt has a combine dressing over the left knee that is dry and intact. Pt is receiving physical therapy at rehab. Pt is not accompanied by anyone, he states his son dropped him off. Pt has TKR R in 12/2020. [de-identified] : Physical examination of the left knee discloses well approximated incision.  No acute discharge erythema or effusions.  Range of motion between 10 to 90 degrees flexion.  No instability, no acute neurovascular deficits to the lower extremity. [de-identified] : X-rays taken of the left knee in AP lateral and skyline projections disclose maintained integrity of the total knee implants in good position. [de-identified] : Stable postop left total knee arthroplasty. [de-identified] : With the use of a Itzel  patient is advised on further care to include progression to a home care program and continue his physical therapy.  He was given an instruction sheet to further advised on flexion and extension exercises as well.  Reevaluation in 1 month to check his progress.  Patient will continue his DVT prophylaxis medication in the interim

## 2021-04-23 ENCOUNTER — APPOINTMENT (OUTPATIENT)
Dept: ORTHOPEDIC SURGERY | Facility: CLINIC | Age: 71
End: 2021-04-23
Payer: MEDICAID

## 2021-04-23 PROCEDURE — 99024 POSTOP FOLLOW-UP VISIT: CPT

## 2021-04-23 NOTE — HISTORY OF PRESENT ILLNESS
[___ Weeks Post Op] : [unfilled] weeks post op [3] : the patient reports pain that is 3/10 in severity [Clean/Dry/Intact] : clean, dry and intact [Healed] : healed [Erythema] : not erythematous [Discharge] : absent of discharge [Swelling] : not swollen [Dehiscence] : not dehisced [de-identified] : Pt returns for follow up for his surgical intervention on  3/11/2021 Left TKR, pt is taking Tylenol for pain PRN, pt is also taking ASA daily.  Pt has not attended physical therapy.  Incisional area to the left knee is dry and intact. [de-identified] : Physical examination of the left knee discloses well-healed incision.  No acute erythema no effusions excellent range of motion of the left knee at 0 to 115 degrees nontender and stable. [de-identified] : Status post left total knee arthroplasty [de-identified] : Patient will be referred to outpatient physical therapy.  Next month he plans on traveling to [facility] for an extended period of time and was instructed to be reevaluated on his return.  At that time repeat x-rays of both knees will be obtained

## 2021-05-26 ENCOUNTER — APPOINTMENT (OUTPATIENT)
Dept: ORTHOPEDIC SURGERY | Facility: CLINIC | Age: 71
End: 2021-05-26
Payer: MEDICAID

## 2021-05-26 DIAGNOSIS — Z96.651 PRESENCE OF RIGHT ARTIFICIAL KNEE JOINT: ICD-10-CM

## 2021-05-26 DIAGNOSIS — Z96.652 PRESENCE OF LEFT ARTIFICIAL KNEE JOINT: ICD-10-CM

## 2021-05-26 PROCEDURE — 73562 X-RAY EXAM OF KNEE 3: CPT | Mod: 50

## 2021-05-26 PROCEDURE — 99213 OFFICE O/P EST LOW 20 MIN: CPT | Mod: 24

## 2021-05-26 NOTE — HISTORY OF PRESENT ILLNESS
[___ Weeks Post Op] : [unfilled] weeks post op [2] : the patient reports pain that is 2/10 in severity [Clean/Dry/Intact] : clean, dry and intact [Healed] : healed [Chills] : no chills [Constipation] : no constipation [Diarrhea] : no diarrhea [Dysuria] : no dysuria [Fever] : no fever [Nausea] : no nausea [Vomiting] : no vomiting [Erythema] : not erythematous [Discharge] : absent of discharge [Swelling] : not swollen [Dehiscence] : not dehisced [de-identified] : Pt returns for follow up for his post op visit,  Surgical intervention,  3/11/2021 left TKR, right TKR 12/28/2021 Pt is attending physical therapy  Pt 3x week,  pt is not taking any pain medication. [de-identified] : Examination of both knees disclose well-healed incisions.  No acute effusions no erythema no defects or deformities.  Excellent stable range of motion to both knees from 5 to 120 degrees bilaterally. [de-identified] : X-rays taken of both knees in AP lateral and skyline projections disclose well-maintained implant positions.  No signs of acute wear or loosening. [de-identified] : Stable postop left total knee arthroplasty [de-identified] : Patient was advised to continue self conditioning and strengthening program for both knees including extension and flexion stretching exercises.  He plans on returning to [facility] shortly.  Reevaluation as needed

## 2022-09-21 NOTE — ED ADULT NURSE NOTE - NSFALLRSKUNASSIST_ED_ALL_ED
Aklief Pregnancy And Lactation Text: It is unknown if this medication is safe to use during pregnancy.  It is unknown if this medication is excreted in breast milk.  Breastfeeding women should use the topical cream on the smallest area of the skin for the shortest time needed while breastfeeding.  Do not apply to nipple and areola. no

## 2024-04-17 NOTE — ASU PATIENT PROFILE, ADULT - VISION (WITH CORRECTIVE LENSES IF THE PATIENT USUALLY WEARS THEM):
12 year old male with epigastric pain and dysphagia here for an upper endoscopy with biopsy Normal vision: sees adequately in most situations; can see medication labels, newsprint

## 2024-06-04 NOTE — PATIENT PROFILE ADULT - CONTRAINDICATIONS & PRECAUTIONS (SELECT ALL THAT APPLY)
This has been addressed in another encounter. Patient started Cefepime on 6/3 per Dr. Bradshaw [facility], may restart Vanco on 6/5/24.      Rafael Davila RN  Infectious Disease on 6/4/2024 at 8:05 AM     none...